# Patient Record
Sex: FEMALE | Race: OTHER | HISPANIC OR LATINO
[De-identification: names, ages, dates, MRNs, and addresses within clinical notes are randomized per-mention and may not be internally consistent; named-entity substitution may affect disease eponyms.]

---

## 2017-01-10 ENCOUNTER — APPOINTMENT (OUTPATIENT)
Dept: INTERNAL MEDICINE | Facility: CLINIC | Age: 42
End: 2017-01-10

## 2017-01-10 ENCOUNTER — MED ADMIN CHARGE (OUTPATIENT)
Age: 42
End: 2017-01-10

## 2017-01-10 ENCOUNTER — OUTPATIENT (OUTPATIENT)
Dept: OUTPATIENT SERVICES | Facility: HOSPITAL | Age: 42
LOS: 1 days | End: 2017-01-10
Payer: MEDICAID

## 2017-01-10 VITALS — BODY MASS INDEX: 21.63 KG/M2 | DIASTOLIC BLOOD PRESSURE: 69 MMHG | WEIGHT: 134 LBS | SYSTOLIC BLOOD PRESSURE: 109 MMHG

## 2017-01-10 DIAGNOSIS — I10 ESSENTIAL (PRIMARY) HYPERTENSION: ICD-10-CM

## 2017-01-10 PROCEDURE — 90670 PCV13 VACCINE IM: CPT

## 2017-01-10 PROCEDURE — 90688 IIV4 VACCINE SPLT 0.5 ML IM: CPT

## 2017-01-10 PROCEDURE — G0463: CPT

## 2017-01-10 PROCEDURE — G0009: CPT

## 2017-01-10 PROCEDURE — G0008: CPT

## 2017-01-11 DIAGNOSIS — Z23 ENCOUNTER FOR IMMUNIZATION: ICD-10-CM

## 2017-01-11 DIAGNOSIS — Z00.00 ENCOUNTER FOR GENERAL ADULT MEDICAL EXAMINATION WITHOUT ABNORMAL FINDINGS: ICD-10-CM

## 2017-01-12 LAB
ANION GAP SERPL CALC-SCNC: 14 MMOL/L
BASOPHILS # BLD AUTO: 0.01 K/UL
BASOPHILS NFR BLD AUTO: 0.2 %
BUN SERPL-MCNC: 13 MG/DL
CALCIUM SERPL-MCNC: 9.4 MG/DL
CHLORIDE SERPL-SCNC: 103 MMOL/L
CO2 SERPL-SCNC: 25 MMOL/L
CREAT SERPL-MCNC: 0.56 MG/DL
EOSINOPHIL # BLD AUTO: 0.05 K/UL
EOSINOPHIL NFR BLD AUTO: 0.9 %
GLUCOSE SERPL-MCNC: 87 MG/DL
HCT VFR BLD CALC: 39.2 %
HGB BLD-MCNC: 13.1 G/DL
IMM GRANULOCYTES NFR BLD AUTO: 0.2 %
LYMPHOCYTES # BLD AUTO: 2.43 K/UL
LYMPHOCYTES NFR BLD AUTO: 46 %
MAN DIFF?: NORMAL
MCHC RBC-ENTMCNC: 31.6 PG
MCHC RBC-ENTMCNC: 33.4 GM/DL
MCV RBC AUTO: 94.7 FL
MONOCYTES # BLD AUTO: 0.42 K/UL
MONOCYTES NFR BLD AUTO: 8 %
NEUTROPHILS # BLD AUTO: 2.36 K/UL
NEUTROPHILS NFR BLD AUTO: 44.7 %
PLATELET # BLD AUTO: 303 K/UL
POTASSIUM SERPL-SCNC: 4 MMOL/L
RBC # BLD: 4.14 M/UL
RBC # FLD: 12.8 %
SODIUM SERPL-SCNC: 142 MMOL/L
TSH SERPL-ACNC: 1.16 UIU/ML
WBC # FLD AUTO: 5.28 K/UL

## 2017-01-23 ENCOUNTER — FORM ENCOUNTER (OUTPATIENT)
Age: 42
End: 2017-01-23

## 2017-01-24 ENCOUNTER — OUTPATIENT (OUTPATIENT)
Dept: OUTPATIENT SERVICES | Facility: HOSPITAL | Age: 42
LOS: 1 days | End: 2017-01-24
Payer: MEDICAID

## 2017-01-24 ENCOUNTER — APPOINTMENT (OUTPATIENT)
Dept: RADIOLOGY | Facility: IMAGING CENTER | Age: 42
End: 2017-01-24

## 2017-01-24 DIAGNOSIS — Z00.00 ENCOUNTER FOR GENERAL ADULT MEDICAL EXAMINATION WITHOUT ABNORMAL FINDINGS: ICD-10-CM

## 2017-01-24 DIAGNOSIS — Z00.8 ENCOUNTER FOR OTHER GENERAL EXAMINATION: ICD-10-CM

## 2017-01-24 DIAGNOSIS — Z23 ENCOUNTER FOR IMMUNIZATION: ICD-10-CM

## 2017-01-24 PROCEDURE — 77080 DXA BONE DENSITY AXIAL: CPT

## 2017-02-14 ENCOUNTER — APPOINTMENT (OUTPATIENT)
Dept: INTERNAL MEDICINE | Facility: CLINIC | Age: 42
End: 2017-02-14

## 2017-03-21 ENCOUNTER — APPOINTMENT (OUTPATIENT)
Dept: ULTRASOUND IMAGING | Facility: IMAGING CENTER | Age: 42
End: 2017-03-21

## 2017-03-21 ENCOUNTER — APPOINTMENT (OUTPATIENT)
Dept: MAMMOGRAPHY | Facility: IMAGING CENTER | Age: 42
End: 2017-03-21

## 2017-04-10 ENCOUNTER — FORM ENCOUNTER (OUTPATIENT)
Age: 42
End: 2017-04-10

## 2017-04-11 ENCOUNTER — APPOINTMENT (OUTPATIENT)
Dept: ULTRASOUND IMAGING | Facility: IMAGING CENTER | Age: 42
End: 2017-04-11

## 2017-04-11 ENCOUNTER — APPOINTMENT (OUTPATIENT)
Dept: MAMMOGRAPHY | Facility: IMAGING CENTER | Age: 42
End: 2017-04-11

## 2017-04-11 ENCOUNTER — OUTPATIENT (OUTPATIENT)
Dept: OUTPATIENT SERVICES | Facility: HOSPITAL | Age: 42
LOS: 1 days | End: 2017-04-11
Payer: MEDICAID

## 2017-04-11 DIAGNOSIS — Z00.8 ENCOUNTER FOR OTHER GENERAL EXAMINATION: ICD-10-CM

## 2017-04-11 PROCEDURE — 76641 ULTRASOUND BREAST COMPLETE: CPT

## 2017-04-11 PROCEDURE — 77067 SCR MAMMO BI INCL CAD: CPT

## 2017-04-11 PROCEDURE — 77063 BREAST TOMOSYNTHESIS BI: CPT

## 2017-07-31 ENCOUNTER — LABORATORY RESULT (OUTPATIENT)
Age: 42
End: 2017-07-31

## 2017-07-31 ENCOUNTER — OUTPATIENT (OUTPATIENT)
Dept: OUTPATIENT SERVICES | Facility: HOSPITAL | Age: 42
LOS: 1 days | End: 2017-07-31
Payer: MEDICAID

## 2017-07-31 ENCOUNTER — APPOINTMENT (OUTPATIENT)
Dept: INTERNAL MEDICINE | Facility: CLINIC | Age: 42
End: 2017-07-31
Payer: MEDICAID

## 2017-07-31 VITALS
HEIGHT: 66 IN | BODY MASS INDEX: 20.89 KG/M2 | WEIGHT: 130 LBS | DIASTOLIC BLOOD PRESSURE: 70 MMHG | TEMPERATURE: 99.3 F | SYSTOLIC BLOOD PRESSURE: 102 MMHG

## 2017-07-31 DIAGNOSIS — I10 ESSENTIAL (PRIMARY) HYPERTENSION: ICD-10-CM

## 2017-07-31 LAB
APTT BLD: 37.6 SEC — HIGH (ref 27.5–37.4)
HBA1C BLD-MCNC: 5.4 % — SIGNIFICANT CHANGE UP (ref 4–5.6)
HCT VFR BLD CALC: 43.6 % — SIGNIFICANT CHANGE UP (ref 34.5–45)
HGB BLD-MCNC: 14.2 G/DL — SIGNIFICANT CHANGE UP (ref 11.5–15.5)
INR BLD: 0.96 RATIO — SIGNIFICANT CHANGE UP (ref 0.88–1.16)
MCHC RBC-ENTMCNC: 31.5 PG — SIGNIFICANT CHANGE UP (ref 27–34)
MCHC RBC-ENTMCNC: 32.6 GM/DL — SIGNIFICANT CHANGE UP (ref 32–36)
MCV RBC AUTO: 96.7 FL — SIGNIFICANT CHANGE UP (ref 80–100)
PLATELET # BLD AUTO: 314 K/UL — SIGNIFICANT CHANGE UP (ref 150–400)
PROTHROM AB SERPL-ACNC: 10.8 SEC — SIGNIFICANT CHANGE UP (ref 10–13.1)
RBC # BLD: 4.51 M/UL — SIGNIFICANT CHANGE UP (ref 3.8–5.2)
RBC # FLD: 13 % — SIGNIFICANT CHANGE UP (ref 10.3–14.5)
WBC # BLD: 4.51 K/UL — SIGNIFICANT CHANGE UP (ref 3.8–10.5)
WBC # FLD AUTO: 4.51 K/UL — SIGNIFICANT CHANGE UP (ref 3.8–10.5)

## 2017-07-31 PROCEDURE — 83036 HEMOGLOBIN GLYCOSYLATED A1C: CPT

## 2017-07-31 PROCEDURE — 80048 BASIC METABOLIC PNL TOTAL CA: CPT

## 2017-07-31 PROCEDURE — 85610 PROTHROMBIN TIME: CPT

## 2017-07-31 PROCEDURE — G0463: CPT

## 2017-07-31 PROCEDURE — 84443 ASSAY THYROID STIM HORMONE: CPT

## 2017-07-31 PROCEDURE — 99213 OFFICE O/P EST LOW 20 MIN: CPT | Mod: GE

## 2017-07-31 PROCEDURE — 85027 COMPLETE CBC AUTOMATED: CPT

## 2017-07-31 PROCEDURE — 85730 THROMBOPLASTIN TIME PARTIAL: CPT

## 2017-08-01 LAB
ANION GAP SERPL CALC-SCNC: 15 MMOL/L — SIGNIFICANT CHANGE UP (ref 5–17)
BUN SERPL-MCNC: 11 MG/DL — SIGNIFICANT CHANGE UP (ref 7–23)
CALCIUM SERPL-MCNC: 9.9 MG/DL — SIGNIFICANT CHANGE UP (ref 8.4–10.5)
CHLORIDE SERPL-SCNC: 101 MMOL/L — SIGNIFICANT CHANGE UP (ref 96–108)
CO2 SERPL-SCNC: 24 MMOL/L — SIGNIFICANT CHANGE UP (ref 22–31)
CREAT SERPL-MCNC: 0.75 MG/DL — SIGNIFICANT CHANGE UP (ref 0.5–1.3)
GLUCOSE SERPL-MCNC: 115 MG/DL — HIGH (ref 70–99)
POTASSIUM SERPL-MCNC: 4.2 MMOL/L — SIGNIFICANT CHANGE UP (ref 3.5–5.3)
POTASSIUM SERPL-SCNC: 4.2 MMOL/L — SIGNIFICANT CHANGE UP (ref 3.5–5.3)
SODIUM SERPL-SCNC: 140 MMOL/L — SIGNIFICANT CHANGE UP (ref 135–145)
TSH SERPL-MCNC: 1.87 UIU/ML — SIGNIFICANT CHANGE UP (ref 0.27–4.2)

## 2017-08-09 DIAGNOSIS — F41.1 GENERALIZED ANXIETY DISORDER: ICD-10-CM

## 2018-01-12 ENCOUNTER — APPOINTMENT (OUTPATIENT)
Dept: INTERNAL MEDICINE | Facility: CLINIC | Age: 43
End: 2018-01-12
Payer: MEDICAID

## 2018-01-12 VITALS
BODY MASS INDEX: 21.69 KG/M2 | HEIGHT: 66 IN | SYSTOLIC BLOOD PRESSURE: 100 MMHG | DIASTOLIC BLOOD PRESSURE: 80 MMHG | WEIGHT: 135 LBS

## 2018-01-12 PROCEDURE — 99214 OFFICE O/P EST MOD 30 MIN: CPT | Mod: GC

## 2018-02-01 ENCOUNTER — OUTPATIENT (OUTPATIENT)
Dept: OUTPATIENT SERVICES | Facility: HOSPITAL | Age: 43
LOS: 1 days | End: 2018-02-01
Payer: MEDICAID

## 2018-02-01 ENCOUNTER — APPOINTMENT (OUTPATIENT)
Dept: OBGYN | Facility: CLINIC | Age: 43
End: 2018-02-01
Payer: MEDICAID

## 2018-02-01 VITALS
DIASTOLIC BLOOD PRESSURE: 62 MMHG | SYSTOLIC BLOOD PRESSURE: 100 MMHG | BODY MASS INDEX: 21.74 KG/M2 | HEIGHT: 66 IN | WEIGHT: 135.25 LBS

## 2018-02-01 DIAGNOSIS — N76.0 ACUTE VAGINITIS: ICD-10-CM

## 2018-02-01 DIAGNOSIS — N95.2 POSTMENOPAUSAL ATROPHIC VAGINITIS: ICD-10-CM

## 2018-02-01 PROCEDURE — G0463: CPT

## 2018-02-01 PROCEDURE — 99213 OFFICE O/P EST LOW 20 MIN: CPT | Mod: GC

## 2018-02-09 ENCOUNTER — APPOINTMENT (OUTPATIENT)
Dept: SURGICAL ONCOLOGY | Facility: CLINIC | Age: 43
End: 2018-02-09
Payer: MEDICAID

## 2018-02-09 VITALS
HEIGHT: 62 IN | BODY MASS INDEX: 24.66 KG/M2 | WEIGHT: 134 LBS | OXYGEN SATURATION: 100 % | DIASTOLIC BLOOD PRESSURE: 68 MMHG | HEART RATE: 75 BPM | SYSTOLIC BLOOD PRESSURE: 109 MMHG

## 2018-02-09 PROCEDURE — 99214 OFFICE O/P EST MOD 30 MIN: CPT

## 2018-02-16 ENCOUNTER — OUTPATIENT (OUTPATIENT)
Dept: OUTPATIENT SERVICES | Facility: HOSPITAL | Age: 43
LOS: 1 days | End: 2018-02-16
Payer: MEDICAID

## 2018-02-16 ENCOUNTER — APPOINTMENT (OUTPATIENT)
Dept: INTERNAL MEDICINE | Facility: CLINIC | Age: 43
End: 2018-02-16
Payer: MEDICAID

## 2018-02-16 VITALS
DIASTOLIC BLOOD PRESSURE: 68 MMHG | OXYGEN SATURATION: 98 % | BODY MASS INDEX: 24.48 KG/M2 | HEIGHT: 62 IN | WEIGHT: 133 LBS | HEART RATE: 73 BPM | SYSTOLIC BLOOD PRESSURE: 110 MMHG

## 2018-02-16 DIAGNOSIS — K64.9 UNSPECIFIED HEMORRHOIDS: ICD-10-CM

## 2018-02-16 DIAGNOSIS — I10 ESSENTIAL (PRIMARY) HYPERTENSION: ICD-10-CM

## 2018-02-16 PROCEDURE — 99213 OFFICE O/P EST LOW 20 MIN: CPT | Mod: GE

## 2018-02-16 PROCEDURE — G0463: CPT

## 2018-04-27 ENCOUNTER — APPOINTMENT (OUTPATIENT)
Dept: INTERNAL MEDICINE | Facility: CLINIC | Age: 43
End: 2018-04-27

## 2018-06-29 ENCOUNTER — FORM ENCOUNTER (OUTPATIENT)
Age: 43
End: 2018-06-29

## 2018-06-30 ENCOUNTER — OUTPATIENT (OUTPATIENT)
Dept: OUTPATIENT SERVICES | Facility: HOSPITAL | Age: 43
LOS: 1 days | End: 2018-06-30
Payer: MEDICAID

## 2018-06-30 ENCOUNTER — APPOINTMENT (OUTPATIENT)
Dept: ULTRASOUND IMAGING | Facility: IMAGING CENTER | Age: 43
End: 2018-06-30
Payer: MEDICAID

## 2018-06-30 ENCOUNTER — APPOINTMENT (OUTPATIENT)
Dept: MAMMOGRAPHY | Facility: IMAGING CENTER | Age: 43
End: 2018-06-30
Payer: MEDICAID

## 2018-06-30 DIAGNOSIS — Z00.8 ENCOUNTER FOR OTHER GENERAL EXAMINATION: ICD-10-CM

## 2018-06-30 PROCEDURE — 77066 DX MAMMO INCL CAD BI: CPT

## 2018-06-30 PROCEDURE — G0279: CPT

## 2018-06-30 PROCEDURE — 76641 ULTRASOUND BREAST COMPLETE: CPT | Mod: 26,50

## 2018-06-30 PROCEDURE — 77066 DX MAMMO INCL CAD BI: CPT | Mod: 26

## 2018-06-30 PROCEDURE — 76641 ULTRASOUND BREAST COMPLETE: CPT

## 2018-06-30 PROCEDURE — G0279: CPT | Mod: 26

## 2018-08-10 ENCOUNTER — TRANSCRIPTION ENCOUNTER (OUTPATIENT)
Age: 43
End: 2018-08-10

## 2018-10-01 ENCOUNTER — OUTPATIENT (OUTPATIENT)
Dept: OUTPATIENT SERVICES | Facility: HOSPITAL | Age: 43
LOS: 1 days | End: 2018-10-01

## 2018-10-18 DIAGNOSIS — Z71.89 OTHER SPECIFIED COUNSELING: ICD-10-CM

## 2019-01-28 ENCOUNTER — LABORATORY RESULT (OUTPATIENT)
Age: 44
End: 2019-01-28

## 2019-01-28 ENCOUNTER — OUTPATIENT (OUTPATIENT)
Dept: OUTPATIENT SERVICES | Facility: HOSPITAL | Age: 44
LOS: 1 days | End: 2019-01-28
Payer: MEDICAID

## 2019-01-28 ENCOUNTER — APPOINTMENT (OUTPATIENT)
Dept: INTERNAL MEDICINE | Facility: CLINIC | Age: 44
End: 2019-01-28
Payer: MEDICAID

## 2019-01-28 VITALS
HEIGHT: 62 IN | DIASTOLIC BLOOD PRESSURE: 80 MMHG | WEIGHT: 141 LBS | BODY MASS INDEX: 25.95 KG/M2 | SYSTOLIC BLOOD PRESSURE: 128 MMHG

## 2019-01-28 DIAGNOSIS — I10 ESSENTIAL (PRIMARY) HYPERTENSION: ICD-10-CM

## 2019-01-28 DIAGNOSIS — J32.9 CHRONIC SINUSITIS, UNSPECIFIED: ICD-10-CM

## 2019-01-28 DIAGNOSIS — K21.9 GASTRO-ESOPHAGEAL REFLUX DISEASE W/OUT ESOPHAGITIS: ICD-10-CM

## 2019-01-28 DIAGNOSIS — K21.9 GASTRO-ESOPHAGEAL REFLUX DISEASE WITHOUT ESOPHAGITIS: ICD-10-CM

## 2019-01-28 DIAGNOSIS — B00.9 HERPESVIRAL INFECTION, UNSPECIFIED: ICD-10-CM

## 2019-01-28 LAB
ALBUMIN SERPL ELPH-MCNC: 4.2 G/DL — SIGNIFICANT CHANGE UP (ref 3.3–5)
ALP SERPL-CCNC: 109 U/L — SIGNIFICANT CHANGE UP (ref 40–120)
ALT FLD-CCNC: 16 U/L — SIGNIFICANT CHANGE UP (ref 10–45)
ANION GAP SERPL CALC-SCNC: 12 MMOL/L — SIGNIFICANT CHANGE UP (ref 5–17)
AST SERPL-CCNC: 20 U/L — SIGNIFICANT CHANGE UP (ref 10–40)
BILIRUB SERPL-MCNC: 0.2 MG/DL — SIGNIFICANT CHANGE UP (ref 0.2–1.2)
BUN SERPL-MCNC: 13 MG/DL — SIGNIFICANT CHANGE UP (ref 7–23)
CALCIUM SERPL-MCNC: 9.9 MG/DL — SIGNIFICANT CHANGE UP (ref 8.4–10.5)
CHLORIDE SERPL-SCNC: 102 MMOL/L — SIGNIFICANT CHANGE UP (ref 96–108)
CHOLEST SERPL-MCNC: 266 MG/DL — HIGH (ref 10–199)
CO2 SERPL-SCNC: 28 MMOL/L — SIGNIFICANT CHANGE UP (ref 22–31)
CREAT SERPL-MCNC: 0.54 MG/DL — SIGNIFICANT CHANGE UP (ref 0.5–1.3)
GLUCOSE SERPL-MCNC: 90 MG/DL — SIGNIFICANT CHANGE UP (ref 70–99)
HBA1C BLD-MCNC: 5.4 % — SIGNIFICANT CHANGE UP (ref 4–5.6)
HCT VFR BLD CALC: 43 % — SIGNIFICANT CHANGE UP (ref 34.5–45)
HDLC SERPL-MCNC: 44 MG/DL — LOW
HGB BLD-MCNC: 14 G/DL — SIGNIFICANT CHANGE UP (ref 11.5–15.5)
LIPID PNL WITH DIRECT LDL SERPL: 176 MG/DL — HIGH
MCHC RBC-ENTMCNC: 31.6 PG — SIGNIFICANT CHANGE UP (ref 27–34)
MCHC RBC-ENTMCNC: 32.6 GM/DL — SIGNIFICANT CHANGE UP (ref 32–36)
MCV RBC AUTO: 97.1 FL — SIGNIFICANT CHANGE UP (ref 80–100)
PLATELET # BLD AUTO: 338 K/UL — SIGNIFICANT CHANGE UP (ref 150–400)
POTASSIUM SERPL-MCNC: 4.5 MMOL/L — SIGNIFICANT CHANGE UP (ref 3.5–5.3)
POTASSIUM SERPL-SCNC: 4.5 MMOL/L — SIGNIFICANT CHANGE UP (ref 3.5–5.3)
PROT SERPL-MCNC: 7.6 G/DL — SIGNIFICANT CHANGE UP (ref 6–8.3)
RBC # BLD: 4.43 M/UL — SIGNIFICANT CHANGE UP (ref 3.8–5.2)
RBC # FLD: 13.3 % — SIGNIFICANT CHANGE UP (ref 10.3–14.5)
SODIUM SERPL-SCNC: 142 MMOL/L — SIGNIFICANT CHANGE UP (ref 135–145)
TOTAL CHOLESTEROL/HDL RATIO MEASUREMENT: 6 RATIO — SIGNIFICANT CHANGE UP (ref 3.3–7.1)
TRIGL SERPL-MCNC: 232 MG/DL — HIGH (ref 10–149)
WBC # BLD: 5.38 K/UL — SIGNIFICANT CHANGE UP (ref 3.8–10.5)
WBC # FLD AUTO: 5.38 K/UL — SIGNIFICANT CHANGE UP (ref 3.8–10.5)

## 2019-01-28 PROCEDURE — 83036 HEMOGLOBIN GLYCOSYLATED A1C: CPT

## 2019-01-28 PROCEDURE — 80061 LIPID PANEL: CPT

## 2019-01-28 PROCEDURE — 80053 COMPREHEN METABOLIC PANEL: CPT

## 2019-01-28 PROCEDURE — 85027 COMPLETE CBC AUTOMATED: CPT

## 2019-01-28 PROCEDURE — G0463: CPT

## 2019-01-28 PROCEDURE — 86304 IMMUNOASSAY TUMOR CA 125: CPT

## 2019-01-28 PROCEDURE — 99213 OFFICE O/P EST LOW 20 MIN: CPT | Mod: GE

## 2019-01-29 LAB — CANCER AG125 SERPL-ACNC: 10 U/ML — SIGNIFICANT CHANGE UP

## 2019-02-01 NOTE — PHYSICAL EXAM
[No Acute Distress] : no acute distress [Normal Sclera/Conjunctiva] : normal sclera/conjunctiva [EOMI] : extraocular movements intact [Supple] : supple [No Lymphadenopathy] : no lymphadenopathy [No Respiratory Distress] : no respiratory distress  [Clear to Auscultation] : lungs were clear to auscultation bilaterally [Regular Rhythm] : with a regular rhythm [Normal S1, S2] : normal S1 and S2 [Non Tender] : non-tender [No HSM] : no HSM [Normal Bowel Sounds] : normal bowel sounds [Coordination Grossly Intact] : coordination grossly intact [de-identified] : Mildly erythematous pharynx

## 2019-02-01 NOTE — HEALTH RISK ASSESSMENT
[Good] : ~his/her~  mood as  good [0] : 1) Little interest or pleasure doing things: Not at all (0) [1] : 2) Feeling down, depressed, or hopeless for several days (1) [Patient reported mammogram was normal] : Patient reported mammogram was normal [Patient reported PAP Smear was normal] : Patient reported PAP Smear was normal [Patient reported bone density results were abnormal] : Patient reported bone density results were abnormal [With Family] : lives with family [Employed] : employed [] : No [[id number]] : 1 [Sexually Active] : not sexually active [MammogramDate] : 06/2018 [MammogramComments] : BiRADS2 [PapSmearDate] : 01/2018 [BoneDensityDate] : 06/2017 [BoneDensityComments] : osteopenia [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

## 2019-02-01 NOTE — REVIEW OF SYSTEMS
[Discharge] : discharge [Nosebleed] : nosebleed [Postnasal Drip] : postnasal drip [Fever] : no fever [Chills] : no chills [Nasal Discharge] : no nasal discharge [Sore Throat] : no sore throat [Chest Pain] : no chest pain [Palpitations] : no palpitations [Shortness Of Breath] : no shortness of breath [Cough] : no cough [Abdominal Pain] : no abdominal pain [Nausea] : no nausea [Vomiting] : no vomiting [Frequency] : no frequency [Headache] : no headache [Dizziness] : no dizziness [FreeTextEntry3] : Yellow crusty discharge

## 2019-02-01 NOTE — ASSESSMENT
[FreeTextEntry1] : The patient is a 43 yo F with PMH of ovarian cancer, dx 2004, s/p CALVIN, BSO, fibrocystic breast disease, GERD, anxiety, and hemorrhoids presenting here for annual physical exam. \par \par a) Sinusitis\par - persistent sinus pain/tenderness for 2 weeks\par - Prescribed Augmentin, x 10 days\par - renewed fluticasone and saline nasal spray\par \par b) GERD\par - stable\par - renewed pantoprazole\par \par c) Ovarian cancer- s/p CALVIN BSO\par - follows with gyn\par - referral for Pap smear \par \par d) HSV-2\par - no active infection\par - prescribed valacyclovir for prophylactic measure\par \par d) HCM\par - flu shot administered LT arm\par - referral for mammogram\par - f/u cbc, hga1c, cmp, lipid panel, CA-125

## 2019-02-01 NOTE — HISTORY OF PRESENT ILLNESS
[FreeTextEntry1] : Annual physical exam [de-identified] : The patient is a 41 yo F with PMH of ovarian cancer, dx 2004, s/p CALVIN, BSO, fibrocystic breast disease, GERD, anxiety, hemorrhoids presenting here for annual physical exam. Four months ago, she had bronchitis and went to urgent care and was treated with amoxicillin. Two weeks ago, she reported sinus pain/pressure, nose bleed secondary to dryness, postnasal drip, watery eyes  and headache. Denies sore throat, cough, fever, chills, dizziness, nausea, and vomiting.  She took three days of amoxicillin and sinus PE + allergy. Also, she has been taking nasal saline spray which provides temporary relief.

## 2019-03-11 ENCOUNTER — OUTPATIENT (OUTPATIENT)
Dept: OUTPATIENT SERVICES | Facility: HOSPITAL | Age: 44
LOS: 1 days | End: 2019-03-11
Payer: MEDICAID

## 2019-03-11 ENCOUNTER — TRANSCRIPTION ENCOUNTER (OUTPATIENT)
Age: 44
End: 2019-03-11

## 2019-03-11 ENCOUNTER — APPOINTMENT (OUTPATIENT)
Dept: INTERNAL MEDICINE | Facility: CLINIC | Age: 44
End: 2019-03-11
Payer: MEDICAID

## 2019-03-11 VITALS
WEIGHT: 141 LBS | BODY MASS INDEX: 25.95 KG/M2 | DIASTOLIC BLOOD PRESSURE: 82 MMHG | OXYGEN SATURATION: 96 % | HEIGHT: 62 IN | HEART RATE: 101 BPM | TEMPERATURE: 99 F | SYSTOLIC BLOOD PRESSURE: 120 MMHG

## 2019-03-11 DIAGNOSIS — I10 ESSENTIAL (PRIMARY) HYPERTENSION: ICD-10-CM

## 2019-03-11 PROCEDURE — G0463: CPT

## 2019-03-11 PROCEDURE — 99213 OFFICE O/P EST LOW 20 MIN: CPT | Mod: GE

## 2019-03-11 RX ORDER — SERTRALINE HYDROCHLORIDE 50 MG/1
50 TABLET, FILM COATED ORAL DAILY
Qty: 30 | Refills: 0 | Status: DISCONTINUED | COMMUNITY
Start: 2018-01-12 | End: 2019-03-11

## 2019-03-12 DIAGNOSIS — J18.9 PNEUMONIA, UNSPECIFIED ORGANISM: ICD-10-CM

## 2019-03-12 DIAGNOSIS — J11.1 INFLUENZA DUE TO UNIDENTIFIED INFLUENZA VIRUS WITH OTHER RESPIRATORY MANIFESTATIONS: ICD-10-CM

## 2019-03-12 NOTE — REVIEW OF SYSTEMS
[Chills] : chills [Fatigue] : fatigue [Nasal Discharge] : nasal discharge [Sore Throat] : sore throat [Muscle Pain] : muscle pain [Fever] : no fever [Night Sweats] : no night sweats [Hoarseness] : no hoarseness [Chest Pain] : no chest pain [Palpitations] : no palpitations [Lower Ext Edema] : no lower extremity edema [Abdominal Pain] : no abdominal pain [Nausea] : no nausea [Vomiting] : no vomiting [Dysuria] : no dysuria [Hematuria] : no hematuria [FreeTextEntry3] : Teary eyes

## 2019-03-12 NOTE — ASSESSMENT
[FreeTextEntry1] : Patient is a 42 year old female with hx of ovarian cancer diagnosed in 2004 s/p CALVIN/BSO, fibrocystic breast disease, anxiety and hemorrhoids who presents today with complaints of flu like symptoms. \par \par # Flu with underlying bacterial PNA \par - patient has diminished breath sounds on the right side RML/RLL likely consistent with a secondary bacterial PNA\par - currently on D3/7 of levaquin and albuterol, continue as as scheduled \par - currently outside the 48hr window for tamiflu\par - discussed using albuterol ATC, will send xopenex in hopes of decreased palpitations which could be anxiety related however explained that there might be a prior auth that might need to be obtain. Will add on atrovent and explained that she can stagger in between albuterol doses \par - if symptoms do not improve or if PO intake decreases, will need to go to the hospital - - patient is aware \par \par Case discussed with Dr. Hanson

## 2019-03-12 NOTE — PHYSICAL EXAM
[Ill-Appearing] : ill-appearing [Normal Sclera/Conjunctiva] : normal sclera/conjunctiva [PERRL] : pupils equal round and reactive to light [EOMI] : extraocular movements intact [Normal Outer Ear/Nose] : the outer ears and nose were normal in appearance [Normal TMs] : both tympanic membranes were normal [Normal Rate] : normal rate  [Regular Rhythm] : with a regular rhythm [Normal S1, S2] : normal S1 and S2 [No Murmur] : no murmur heard [No Edema] : there was no peripheral edema [de-identified] : Clinically stable  [de-identified] : Erythematous oropharynx with some tonsillar swelling  [de-identified] : Wheezes in upper lung fields and decreased breath sounds in the RML/RLL.

## 2019-03-12 NOTE — HISTORY OF PRESENT ILLNESS
[FreeTextEntry8] : Patient is a 42 year old female with hx of ovarian cancer diagnosed in 2004 s/p CALVIN/BSO, fibrocystic breast disease, anxiety and hemorrhoids who presents today with complaints of flu like symptoms. Patient was recently seen at the end of January for CPE and was treated for sinusitis at the time. \par \par Patient presents today with flu like symptoms. States that she started having fevers, myalgias, sore throat this past Thursday. Was taking dayquil nyquil but went to urgent care in [facility] and was swabbed and told that she was flu positive. Patient was however not given tamiflu - - unclear as to why. CXR was performed with a note of a compounding PNA - - and she was started on levaquin (D3/7) along with albuterol States that she has not gotten worse but states that she is still not getting better. \par \par Still endorses productive cough with yellow sputum, myalgias along with intermittent choking episodes from the congestion. States that this is giving her anxiety and subsequent insomnia.

## 2019-03-15 RX ORDER — LEVALBUTEROL HYDROCHLORIDE 0.31 MG/3ML
0.31 SOLUTION RESPIRATORY (INHALATION)
Qty: 1 | Refills: 0 | Status: DISCONTINUED | COMMUNITY
Start: 2019-03-11 | End: 2019-03-15

## 2019-03-29 ENCOUNTER — APPOINTMENT (OUTPATIENT)
Dept: INTERNAL MEDICINE | Facility: CLINIC | Age: 44
End: 2019-03-29
Payer: MEDICAID

## 2019-03-29 ENCOUNTER — OUTPATIENT (OUTPATIENT)
Dept: OUTPATIENT SERVICES | Facility: HOSPITAL | Age: 44
LOS: 1 days | End: 2019-03-29
Payer: MEDICAID

## 2019-03-29 VITALS
SYSTOLIC BLOOD PRESSURE: 100 MMHG | HEIGHT: 62 IN | DIASTOLIC BLOOD PRESSURE: 60 MMHG | HEART RATE: 79 BPM | OXYGEN SATURATION: 98 % | BODY MASS INDEX: 26.13 KG/M2 | WEIGHT: 142 LBS

## 2019-03-29 DIAGNOSIS — K64.8 OTHER HEMORRHOIDS: ICD-10-CM

## 2019-03-29 DIAGNOSIS — Z80.41 FAMILY HISTORY OF MALIGNANT NEOPLASM OF OVARY: ICD-10-CM

## 2019-03-29 DIAGNOSIS — Z87.09 PERSONAL HISTORY OF OTHER DISEASES OF THE RESPIRATORY SYSTEM: ICD-10-CM

## 2019-03-29 DIAGNOSIS — L03.119 CELLULITIS OF UNSPECIFIED PART OF LIMB: ICD-10-CM

## 2019-03-29 DIAGNOSIS — Z87.440 PERSONAL HISTORY OF URINARY (TRACT) INFECTIONS: ICD-10-CM

## 2019-03-29 DIAGNOSIS — Z87.19 PERSONAL HISTORY OF OTHER DISEASES OF THE DIGESTIVE SYSTEM: ICD-10-CM

## 2019-03-29 DIAGNOSIS — Z12.72 ENCOUNTER FOR SCREENING FOR MALIGNANT NEOPLASM OF VAGINA: ICD-10-CM

## 2019-03-29 DIAGNOSIS — Z92.29 PERSONAL HISTORY OF OTHER DRUG THERAPY: ICD-10-CM

## 2019-03-29 DIAGNOSIS — I10 ESSENTIAL (PRIMARY) HYPERTENSION: ICD-10-CM

## 2019-03-29 DIAGNOSIS — Z87.898 PERSONAL HISTORY OF OTHER SPECIFIED CONDITIONS: ICD-10-CM

## 2019-03-29 DIAGNOSIS — Z01.419 ENCOUNTER FOR GYNECOLOGICAL EXAMINATION (GENERAL) (ROUTINE) W/OUT ABNORMAL FINDINGS: ICD-10-CM

## 2019-03-29 DIAGNOSIS — R05 COUGH: ICD-10-CM

## 2019-03-29 DIAGNOSIS — L98.9 DISORDER OF THE SKIN AND SUBCUTANEOUS TISSUE, UNSPECIFIED: ICD-10-CM

## 2019-03-29 PROCEDURE — G0463: CPT

## 2019-03-29 PROCEDURE — 99213 OFFICE O/P EST LOW 20 MIN: CPT | Mod: GE

## 2019-03-29 RX ORDER — ADHESIVE TAPE 3"X 2.3 YD
50 MCG TAPE, NON-MEDICATED TOPICAL
Qty: 30 | Refills: 0 | Status: DISCONTINUED | COMMUNITY
Start: 2018-01-12 | End: 2019-03-29

## 2019-03-29 RX ORDER — OMEPRAZOLE 20 MG/1
20 CAPSULE, DELAYED RELEASE ORAL DAILY
Qty: 30 | Refills: 2 | Status: DISCONTINUED | COMMUNITY
Start: 2018-02-16 | End: 2019-03-29

## 2019-03-29 RX ORDER — CLOTRIMAZOLE AND BETAMETHASONE DIPROPIONATE 10; .5 MG/G; MG/G
1-0.05 CREAM TOPICAL TWICE DAILY
Qty: 1 | Refills: 0 | Status: DISCONTINUED | COMMUNITY
Start: 2018-01-12 | End: 2019-03-29

## 2019-03-29 RX ORDER — ALPRAZOLAM 0.25 MG/1
0.25 TABLET ORAL EVERY 6 HOURS
Qty: 16 | Refills: 0 | Status: DISCONTINUED | COMMUNITY
Start: 2017-07-31 | End: 2019-03-29

## 2019-03-29 RX ORDER — HYDROCORTISONE 25 MG/G
2.5 CREAM TOPICAL TWICE DAILY
Qty: 1 | Refills: 0 | Status: DISCONTINUED | COMMUNITY
Start: 2018-02-16 | End: 2019-03-29

## 2019-03-29 RX ORDER — AMOXICILLIN AND CLAVULANATE POTASSIUM 875; 125 MG/1; MG/1
875-125 TABLET, COATED ORAL
Qty: 20 | Refills: 0 | Status: DISCONTINUED | COMMUNITY
Start: 2019-01-28 | End: 2019-03-29

## 2019-04-01 NOTE — PHYSICAL EXAM
[No Acute Distress] : no acute distress [Well-Appearing] : well-appearing [Normal Sclera/Conjunctiva] : normal sclera/conjunctiva [Normal Outer Ear/Nose] : the outer ears and nose were normal in appearance [Normal Oropharynx] : the oropharynx was normal [Normal TMs] : both tympanic membranes were normal [Supple] : supple [No Lymphadenopathy] : no lymphadenopathy [No Respiratory Distress] : no respiratory distress  [Clear to Auscultation] : lungs were clear to auscultation bilaterally [Normal Rate] : normal rate  [Regular Rhythm] : with a regular rhythm [de-identified] : mild erythema of nasal mucosa

## 2019-04-01 NOTE — REVIEW OF SYSTEMS
[Postnasal Drip] : postnasal drip [Cough] : cough [Negative] : Neurological [Fever] : no fever [Chills] : no chills [Discharge] : no discharge [Redness] : no redness [Itching] : no itching [Earache] : no earache [Hoarseness] : no hoarseness [Nasal Discharge] : no nasal discharge [Chest Pain] : no chest pain [Palpitations] : no palpitations [Shortness Of Breath] : no shortness of breath [Wheezing] : no wheezing

## 2019-04-01 NOTE — HISTORY OF PRESENT ILLNESS
[FreeTextEntry1] : 43F with PMH ovarian CA (diagnosed 2004, s/p CALVIN/BSO), fibrocystic breast disease, osteopenia, anxiety and hemorrhoids presenting for follow-up.  [de-identified] : Patient last seen in clinic 3/11 after being diagnosed with influenza with superimposed bacterial PNA at an urgent care center several days prior. Patient was on antibiotics at the time but was not feeling much better. She was endorsing persistent productive cough minimally improved by the albuterol inhaler prescribed to her by urgent care. She was prescribed an Atrovent inhaler and levalbuterol nebulizer, however, patient states she never received the medications d/t insurance. Patient presents today with throat irritation and cough sometimes productive of greenish sputum. She is overall feeling better but would like relief from these sxs. Denies fever, chills, worsening nasal congestion or cough.

## 2019-04-03 DIAGNOSIS — R09.82 POSTNASAL DRIP: ICD-10-CM

## 2019-04-03 DIAGNOSIS — R05 COUGH: ICD-10-CM

## 2019-04-03 DIAGNOSIS — J18.9 PNEUMONIA, UNSPECIFIED ORGANISM: ICD-10-CM

## 2019-12-17 ENCOUNTER — APPOINTMENT (OUTPATIENT)
Dept: INTERNAL MEDICINE | Facility: CLINIC | Age: 44
End: 2019-12-17
Payer: MEDICAID

## 2019-12-17 ENCOUNTER — OUTPATIENT (OUTPATIENT)
Dept: OUTPATIENT SERVICES | Facility: HOSPITAL | Age: 44
LOS: 1 days | End: 2019-12-17
Payer: MEDICAID

## 2019-12-17 VITALS
BODY MASS INDEX: 24.29 KG/M2 | SYSTOLIC BLOOD PRESSURE: 120 MMHG | HEIGHT: 62 IN | DIASTOLIC BLOOD PRESSURE: 70 MMHG | WEIGHT: 132 LBS

## 2019-12-17 DIAGNOSIS — C56.9 MALIGNANT NEOPLASM OF UNSPECIFIED OVARY: ICD-10-CM

## 2019-12-17 DIAGNOSIS — I10 ESSENTIAL (PRIMARY) HYPERTENSION: ICD-10-CM

## 2019-12-17 PROCEDURE — 99214 OFFICE O/P EST MOD 30 MIN: CPT | Mod: GC

## 2019-12-17 PROCEDURE — G0463: CPT

## 2019-12-17 RX ORDER — IPRATROPIUM BROMIDE 17 UG/1
17 AEROSOL, METERED RESPIRATORY (INHALATION) 4 TIMES DAILY
Qty: 1 | Refills: 1 | Status: DISCONTINUED | COMMUNITY
Start: 2019-03-11 | End: 2019-12-17

## 2019-12-17 NOTE — PHYSICAL EXAM
[No Acute Distress] : no acute distress [Well Nourished] : well nourished [Well Developed] : well developed [Well-Appearing] : well-appearing [Normal Sclera/Conjunctiva] : normal sclera/conjunctiva [EOMI] : extraocular movements intact [PERRL] : pupils equal round and reactive to light [Normal Oropharynx] : the oropharynx was normal [No JVD] : no jugular venous distention [No Lymphadenopathy] : no lymphadenopathy [Normal Outer Ear/Nose] : the outer ears and nose were normal in appearance [Thyroid Normal, No Nodules] : the thyroid was normal and there were no nodules present [Supple] : supple [No Accessory Muscle Use] : no accessory muscle use [Clear to Auscultation] : lungs were clear to auscultation bilaterally [No Respiratory Distress] : no respiratory distress  [Normal Rate] : normal rate  [Normal S1, S2] : normal S1 and S2 [No Murmur] : no murmur heard [Regular Rhythm] : with a regular rhythm [No Abdominal Bruit] : a ~M bruit was not heard ~T in the abdomen [No Carotid Bruits] : no carotid bruits [No Varicosities] : no varicosities [No Palpable Aorta] : no palpable aorta [Pedal Pulses Present] : the pedal pulses are present [No Edema] : there was no peripheral edema [Soft] : abdomen soft [No Extremity Clubbing/Cyanosis] : no extremity clubbing/cyanosis [No Masses] : no abdominal mass palpated [Non Tender] : non-tender [Non-distended] : non-distended [Normal Bowel Sounds] : normal bowel sounds [No HSM] : no HSM [Normal Posterior Cervical Nodes] : no posterior cervical lymphadenopathy [Normal Anterior Cervical Nodes] : no anterior cervical lymphadenopathy [No CVA Tenderness] : no CVA  tenderness [No Spinal Tenderness] : no spinal tenderness [No Joint Swelling] : no joint swelling [Coordination Grossly Intact] : coordination grossly intact [Grossly Normal Strength/Tone] : grossly normal strength/tone [No Rash] : no rash [No Focal Deficits] : no focal deficits [Deep Tendon Reflexes (DTR)] : deep tendon reflexes were 2+ and symmetric [Normal Gait] : normal gait [Normal Affect] : the affect was normal [Normal Insight/Judgement] : insight and judgment were intact

## 2019-12-19 NOTE — ASSESSMENT
[FreeTextEntry1] : 43F with PMH ovarian CA (diagnosed 2004, s/p CALVIN/BSO), fibrocystic breast disease, osteopenia, anxiety and hemorrhoids presenting for TB forms.\par \par #latent TB- Reactivation of TB is likely rare in her case. She was probably exposed many years ago from her home country, and is asymptomatic. It may be good to treat her in the case that she ever becomes immunocompromised, or requires more chemo. Will document latent TB as "not indicated", but will "electively decide to treat." She request treatment after new years.\par \par #F/U in 5 weeks for CPE and TB therapy.

## 2019-12-19 NOTE — HISTORY OF PRESENT ILLNESS
[FreeTextEntry1] : Forms [de-identified] : 43F with PMH ovarian CA (diagnosed 2004, s/p CALVIN/BSO), fibrocystic breast disease, osteopenia, anxiety and hemorrhoids presenting for forms regarding treatment for latent TB. She notes that she recently started working in the [employer], and that she had a T Spot test for TB which was positive and a PPD that was indeterminate. She also had Xray of chest which was negatibve. SHe notes that history wise, she has been in the  for the last 25 years. She has lived in [place], is not homeless, does not use drugs and is asymptomatic from TB standpoint: denies fevers, chills, cough, night sweats, weight loss. She notes having BCG in past in [place], and traveling back every 2-3 years. She has felt well recenlty and has no complaints. She is wondering whether or not to be treated for potential latent TB.

## 2019-12-30 DIAGNOSIS — Z22.7 LATENT TUBERCULOSIS: ICD-10-CM

## 2019-12-30 DIAGNOSIS — C56.9 MALIGNANT NEOPLASM OF UNSPECIFIED OVARY: ICD-10-CM

## 2020-03-11 ENCOUNTER — APPOINTMENT (OUTPATIENT)
Dept: SURGICAL ONCOLOGY | Facility: CLINIC | Age: 45
End: 2020-03-11
Payer: MEDICAID

## 2020-03-11 VITALS
HEIGHT: 62 IN | RESPIRATION RATE: 15 BRPM | OXYGEN SATURATION: 99 % | WEIGHT: 144 LBS | BODY MASS INDEX: 26.5 KG/M2 | SYSTOLIC BLOOD PRESSURE: 130 MMHG | DIASTOLIC BLOOD PRESSURE: 74 MMHG | HEART RATE: 87 BPM

## 2020-03-11 PROCEDURE — 99214 OFFICE O/P EST MOD 30 MIN: CPT

## 2020-03-11 NOTE — HISTORY OF PRESENT ILLNESS
[de-identified] : 45 y/o female presents for breast follow up.\par \par Diagnostic Bilateral Breast US/MMG (6/30/18): No mammographic evidence of malignancy. No sonographic evidence of malignancy. Recommend yearly imaging. BIRADS-2\par MMG/US 4/11/17: No evidence of malignancy, probably benign hypoechoic mass right breast 10:00 2-3 cm FN which has been stable since 1/26/16. BIRADS-3 \par \par Her history is significant for benign bilateral breast surgeries in 2004. \par She also has a personal history of ovarian cancer. \par \par She notes no new recent imaging.\par She states she feels that her breasts have increased in size.\par Denies palpable breast masses, nipple discharge, nipple retraction/inversion, or skin changes. \par Denies breast pain. \par Denies fever or chills.

## 2020-03-11 NOTE — CONSULT LETTER
[Dear  ___] : Dear  [unfilled], [Courtesy Letter:] : I had the pleasure of seeing your patient, [unfilled], in my office today. [Please see my note below.] : Please see my note below. [Sincerely,] : Sincerely, [FreeTextEntry3] : Ramo Coppola MD FACS

## 2020-03-11 NOTE — ADDENDUM
[FreeTextEntry1] : I, Chyna Potts, acted solely as a scribe for Dr. Ramo Coppola on this date 03/11/2020.

## 2020-03-11 NOTE — ASSESSMENT
[FreeTextEntry1] : Fibrocystic breast changes\par Normal physical examination\par BIRADS-2 breast imaging June 2018\par Will order yearly breast imaging now\par RTO 1 year after next year's breast imaging

## 2020-03-11 NOTE — PHYSICAL EXAM
[Normal] : supple, no neck mass and thyroid not enlarged [Normal Neck Lymph Nodes] : normal neck lymph nodes  [Normal Supraclavicular Lymph Nodes] : normal supraclavicular lymph nodes [Normal Groin Lymph Nodes] : normal groin lymph nodes [Normal Axillary Lymph Nodes] : normal axillary lymph nodes [Normal] : oriented to person, place and time, with appropriate affect [de-identified] : No masses or adenopathy bilaterally.

## 2020-04-20 ENCOUNTER — OUTPATIENT (OUTPATIENT)
Dept: OUTPATIENT SERVICES | Facility: HOSPITAL | Age: 45
LOS: 1 days | End: 2020-04-20
Payer: MEDICAID

## 2020-04-20 ENCOUNTER — APPOINTMENT (OUTPATIENT)
Dept: MAMMOGRAPHY | Facility: IMAGING CENTER | Age: 45
End: 2020-04-20
Payer: MEDICAID

## 2020-04-20 ENCOUNTER — APPOINTMENT (OUTPATIENT)
Dept: ULTRASOUND IMAGING | Facility: IMAGING CENTER | Age: 45
End: 2020-04-20
Payer: MEDICAID

## 2020-04-20 ENCOUNTER — RESULT REVIEW (OUTPATIENT)
Age: 45
End: 2020-04-20

## 2020-04-20 DIAGNOSIS — N60.19 DIFFUSE CYSTIC MASTOPATHY OF UNSPECIFIED BREAST: ICD-10-CM

## 2020-04-20 PROCEDURE — 76641 ULTRASOUND BREAST COMPLETE: CPT

## 2020-04-20 PROCEDURE — 76641 ULTRASOUND BREAST COMPLETE: CPT | Mod: 26,50

## 2020-04-20 PROCEDURE — 77063 BREAST TOMOSYNTHESIS BI: CPT | Mod: 26

## 2020-04-20 PROCEDURE — 77063 BREAST TOMOSYNTHESIS BI: CPT

## 2020-04-20 PROCEDURE — 77067 SCR MAMMO BI INCL CAD: CPT | Mod: 26

## 2020-04-20 PROCEDURE — 77067 SCR MAMMO BI INCL CAD: CPT

## 2020-05-01 ENCOUNTER — APPOINTMENT (OUTPATIENT)
Dept: INTERNAL MEDICINE | Facility: CLINIC | Age: 45
End: 2020-05-01

## 2020-05-05 ENCOUNTER — APPOINTMENT (OUTPATIENT)
Dept: INTERNAL MEDICINE | Facility: CLINIC | Age: 45
End: 2020-05-05

## 2020-05-05 ENCOUNTER — OUTPATIENT (OUTPATIENT)
Dept: OUTPATIENT SERVICES | Facility: HOSPITAL | Age: 45
LOS: 1 days | End: 2020-05-05
Payer: MEDICAID

## 2020-05-05 DIAGNOSIS — I10 ESSENTIAL (PRIMARY) HYPERTENSION: ICD-10-CM

## 2020-05-05 DIAGNOSIS — Z22.7 LATENT TUBERCULOSIS: ICD-10-CM

## 2020-05-05 PROCEDURE — G0463: CPT

## 2020-06-10 ENCOUNTER — APPOINTMENT (OUTPATIENT)
Dept: INTERNAL MEDICINE | Facility: CLINIC | Age: 45
End: 2020-06-10

## 2020-06-12 LAB
ALBUMIN SERPL ELPH-MCNC: 4.5 G/DL
ALP BLD-CCNC: 117 U/L
ALT SERPL-CCNC: 15 U/L
ANION GAP SERPL CALC-SCNC: 12 MMOL/L
AST SERPL-CCNC: 19 U/L
BASOPHILS # BLD AUTO: 0.03 K/UL
BASOPHILS NFR BLD AUTO: 0.5 %
BILIRUB SERPL-MCNC: 0.3 MG/DL
BUN SERPL-MCNC: 11 MG/DL
CALCIUM SERPL-MCNC: 9.7 MG/DL
CANCER AG125 SERPL-ACNC: 9 U/ML
CHLORIDE SERPL-SCNC: 103 MMOL/L
CHOLEST SERPL-MCNC: 289 MG/DL
CHOLEST/HDLC SERPL: 5.9 RATIO
CO2 SERPL-SCNC: 26 MMOL/L
CREAT SERPL-MCNC: 0.55 MG/DL
EOSINOPHIL # BLD AUTO: 0.18 K/UL
EOSINOPHIL NFR BLD AUTO: 3 %
ESTIMATED AVERAGE GLUCOSE: 111 MG/DL
GLUCOSE SERPL-MCNC: 97 MG/DL
HBA1C MFR BLD HPLC: 5.5 %
HCT VFR BLD CALC: 43.3 %
HDLC SERPL-MCNC: 49 MG/DL
HGB BLD-MCNC: 14.2 G/DL
IMM GRANULOCYTES NFR BLD AUTO: 0.2 %
LDLC SERPL CALC-MCNC: 203 MG/DL
LYMPHOCYTES # BLD AUTO: 3.08 K/UL
LYMPHOCYTES NFR BLD AUTO: 51.1 %
MAN DIFF?: NORMAL
MCHC RBC-ENTMCNC: 31.1 PG
MCHC RBC-ENTMCNC: 32.8 GM/DL
MCV RBC AUTO: 94.7 FL
MONOCYTES # BLD AUTO: 0.51 K/UL
MONOCYTES NFR BLD AUTO: 8.5 %
NEUTROPHILS # BLD AUTO: 2.22 K/UL
NEUTROPHILS NFR BLD AUTO: 36.7 %
PLATELET # BLD AUTO: 349 K/UL
POTASSIUM SERPL-SCNC: 4.3 MMOL/L
PROT SERPL-MCNC: 7.2 G/DL
RBC # BLD: 4.57 M/UL
RBC # FLD: 12.4 %
SODIUM SERPL-SCNC: 141 MMOL/L
TRIGL SERPL-MCNC: 184 MG/DL
WBC # FLD AUTO: 6.03 K/UL

## 2020-09-17 ENCOUNTER — APPOINTMENT (OUTPATIENT)
Dept: INTERNAL MEDICINE | Facility: CLINIC | Age: 45
End: 2020-09-17
Payer: MEDICAID

## 2020-09-17 ENCOUNTER — LABORATORY RESULT (OUTPATIENT)
Age: 45
End: 2020-09-17

## 2020-09-17 ENCOUNTER — OUTPATIENT (OUTPATIENT)
Dept: OUTPATIENT SERVICES | Facility: HOSPITAL | Age: 45
LOS: 1 days | End: 2020-09-17
Payer: MEDICAID

## 2020-09-17 VITALS
DIASTOLIC BLOOD PRESSURE: 70 MMHG | SYSTOLIC BLOOD PRESSURE: 118 MMHG | HEIGHT: 62 IN | WEIGHT: 144 LBS | BODY MASS INDEX: 26.5 KG/M2

## 2020-09-17 DIAGNOSIS — I10 ESSENTIAL (PRIMARY) HYPERTENSION: ICD-10-CM

## 2020-09-17 DIAGNOSIS — M85.80 OTHER SPECIFIED DISORDERS OF BONE DENSITY AND STRUCTURE, UNSPECIFIED SITE: ICD-10-CM

## 2020-09-17 DIAGNOSIS — R21 RASH AND OTHER NONSPECIFIC SKIN ERUPTION: ICD-10-CM

## 2020-09-17 DIAGNOSIS — Z22.7 LATENT TUBERCULOSIS: ICD-10-CM

## 2020-09-17 PROCEDURE — 99213 OFFICE O/P EST LOW 20 MIN: CPT | Mod: GE

## 2020-09-17 PROCEDURE — G0463: CPT

## 2020-09-17 PROCEDURE — 86480 TB TEST CELL IMMUN MEASURE: CPT

## 2020-09-17 RX ORDER — GUAIFENESIN 600 MG/1
600 TABLET, EXTENDED RELEASE ORAL
Qty: 24 | Refills: 0 | Status: DISCONTINUED | COMMUNITY
Start: 2019-03-29 | End: 2020-09-17

## 2020-09-17 NOTE — PHYSICAL EXAM
[No Acute Distress] : no acute distress [Well Nourished] : well nourished [Well Developed] : well developed [Well-Appearing] : well-appearing [Normal Sclera/Conjunctiva] : normal sclera/conjunctiva [PERRL] : pupils equal round and reactive to light [EOMI] : extraocular movements intact [Normal Outer Ear/Nose] : the outer ears and nose were normal in appearance [Normal Oropharynx] : the oropharynx was normal [No Lymphadenopathy] : no lymphadenopathy [Supple] : supple [No Respiratory Distress] : no respiratory distress  [No Accessory Muscle Use] : no accessory muscle use [Clear to Auscultation] : lungs were clear to auscultation bilaterally [Normal Rate] : normal rate  [Regular Rhythm] : with a regular rhythm [Normal S1, S2] : normal S1 and S2 [No Murmur] : no murmur heard [Pedal Pulses Present] : the pedal pulses are present [No Edema] : there was no peripheral edema [Soft] : abdomen soft [Non Tender] : non-tender [Non-distended] : non-distended [No Masses] : no abdominal mass palpated [No HSM] : no HSM [Normal Bowel Sounds] : normal bowel sounds [Normal Posterior Cervical Nodes] : no posterior cervical lymphadenopathy [Normal Anterior Cervical Nodes] : no anterior cervical lymphadenopathy [No Spinal Tenderness] : no spinal tenderness [No Joint Swelling] : no joint swelling [Grossly Normal Strength/Tone] : grossly normal strength/tone [No Rash] : no rash [Coordination Grossly Intact] : coordination grossly intact [No Focal Deficits] : no focal deficits [Normal Gait] : normal gait [Normal Affect] : the affect was normal [Alert and Oriented x3] : oriented to person, place, and time [Normal Insight/Judgement] : insight and judgment were intact

## 2020-09-18 NOTE — HISTORY OF PRESENT ILLNESS
[FreeTextEntry1] : CPE [de-identified] : 45F with PMH of ovarian CA (diagnosed 2004, s/p CALVIN/BSO), fibrocystic breast disease, osteopenia, anxiety and hemorrhoids, latent tb presenting for cpe. Patient questioning about starting latent TB treatment. Had discussed with Dr. Ferreira about starting 3 month tx (INH/rifamp). Was previously tested positive on T-spot and indeterminate PPD at [facility]. CXR negative. No records in [employer]/[facility]. Has occasional allergies. No night sweats, SOB, fevers, chills. \par \par Diet: eats large meals\par Meds: allergy med, pantoprazole

## 2020-09-18 NOTE — PLAN
[FreeTextEntry1] : #Latent TB\par - quant gold today; will task gordon to f/u, if positive will advise pt to start tx \par - will prescribe INH/rifapentine/pyridoxine qwkly for 3 months\par - advised pt to f/u with me in 5 wks (needs monthly monitoring while on regimen)\par - work letter given \par \par #HLD\par -  on lipid panel 5/2020\par - discussed starting medication (statin) but pt would like to start with lifestyle changes (diet and exercise), wants to hold off on starting medication \par - will consider repeat lipid profile next visit \par \par #HCM\par - Labs: wnl from 5/2020\par - Screening: gyn referral given today; mammo 4/20/20 (BIRADS2); DEXA 2017 showed osteopenia in spine, DEXA ordered today; optometry referral given \par - Imm: flu shot will be given at [facility]\par \par RTC in 5 wks for f/u on TB meds\par D/w Dr. Bernabe\par Adilene Mei PGY3

## 2020-09-18 NOTE — ASSESSMENT
[FreeTextEntry1] : 45F with PMH of ovarian CA (diagnosed 2004, s/p CALVIN/BSO), fibrocystic breast disease, osteopenia, anxiety and hemorrhoids, latent tb p/f cpe.

## 2020-09-18 NOTE — HEALTH RISK ASSESSMENT
[No] : In the past 12 months have you used drugs other than those required for medical reasons? No [With Family] : lives with family [Employed] : employed [] : No [de-identified] :  walk, run, play volleyball   [MammogramDate] : 04/20 [BoneDensityDate] : 2017 [ColonoscopyDate] : 09/13 [de-identified] : sister  [de-identified] : work at [employer] in [place]

## 2020-09-18 NOTE — REVIEW OF SYSTEMS
[Insomnia] : insomnia [Anxiety] : anxiety [Fever] : no fever [Chills] : no chills [Vision Problems] : no vision problems [Sore Throat] : no sore throat [Chest Pain] : no chest pain [Lower Ext Edema] : no lower extremity edema [Shortness Of Breath] : no shortness of breath [Cough] : no cough [Abdominal Pain] : no abdominal pain [Nausea] : no nausea [Constipation] : no constipation [Diarrhea] : diarrhea [Vomiting] : no vomiting [Dysuria] : no dysuria [Hematuria] : no hematuria [Joint Pain] : no joint pain [Skin Rash] : no skin rash [Headache] : no headache [Depression] : no depression

## 2020-09-19 LAB
GAMMA INTERFERON BACKGROUND BLD IA-ACNC: 0.04 IU/ML — SIGNIFICANT CHANGE UP
M TB IFN-G BLD-IMP: POSITIVE
M TB IFN-G CD4+ BCKGRND COR BLD-ACNC: 2.63 IU/ML — SIGNIFICANT CHANGE UP
M TB IFN-G CD4+CD8+ BCKGRND COR BLD-ACNC: 3.26 IU/ML — SIGNIFICANT CHANGE UP
QUANT TB PLUS MITOGEN MINUS NIL: 6.46 IU/ML — SIGNIFICANT CHANGE UP

## 2020-09-28 ENCOUNTER — OUTPATIENT (OUTPATIENT)
Dept: OUTPATIENT SERVICES | Facility: HOSPITAL | Age: 45
LOS: 1 days | End: 2020-09-28
Payer: MEDICAID

## 2020-09-28 ENCOUNTER — APPOINTMENT (OUTPATIENT)
Dept: INTERNAL MEDICINE | Facility: CLINIC | Age: 45
End: 2020-09-28

## 2020-09-28 DIAGNOSIS — I10 ESSENTIAL (PRIMARY) HYPERTENSION: ICD-10-CM

## 2020-09-28 DIAGNOSIS — Z22.7 LATENT TUBERCULOSIS: ICD-10-CM

## 2020-09-28 DIAGNOSIS — N39.0 URINARY TRACT INFECTION, SITE NOT SPECIFIED: ICD-10-CM

## 2020-09-28 PROCEDURE — G0463: CPT

## 2020-09-29 ENCOUNTER — APPOINTMENT (OUTPATIENT)
Dept: OBGYN | Facility: CLINIC | Age: 45
End: 2020-09-29
Payer: MEDICAID

## 2020-09-29 ENCOUNTER — OUTPATIENT (OUTPATIENT)
Dept: OUTPATIENT SERVICES | Facility: HOSPITAL | Age: 45
LOS: 1 days | End: 2020-09-29
Payer: MEDICAID

## 2020-09-29 VITALS — DIASTOLIC BLOOD PRESSURE: 80 MMHG | SYSTOLIC BLOOD PRESSURE: 118 MMHG | BODY MASS INDEX: 27.07 KG/M2 | WEIGHT: 148 LBS

## 2020-09-29 DIAGNOSIS — N76.0 ACUTE VAGINITIS: ICD-10-CM

## 2020-09-29 PROCEDURE — 99213 OFFICE O/P EST LOW 20 MIN: CPT | Mod: GE

## 2020-09-29 NOTE — ASSESSMENT
[FreeTextEntry1] : Ms. TREVINO is a 45 year F with a PMH of ovarian CA (diagnosed 2004, s/p CALVIN/BSO), fibrocystic breast disease, osteopenia, anxiety and hemorrhoids here for follow up. \par \par #UTI\par UA positive at home has symptoms of UTI. Will treat.\par - Will give 5 day course of Macrobid 100mg BID and follow up regarding improvement\par \par #LTBI\par CXR negative in the past. Quant TB+. History of BCG vaccine. No active symptoms. Likely LTBI\par - Extensive instruction and education on how to take medications for a total course of 3 months \par - INH 300mg 3 pills qweekly\par - Pyridoxine 50mg 1 pill qweekly\par - Rifapentin 150mg 6 pills qweekly which out of pocket cost as per pharmacy is ~$123 per month (~$369 for whole course)\par - Patient voiced she will take 10 pills total once a day in a week, she will start Saturday as we agreed to prevent any drug drug interactions with the antibiotic (Monday-Friday)\par - Has a follow up in late October with clinic will need repeat LFTs check and monitor for side effects\par - Will call patient later this week to make sure patient has all her medications\par \par \par Will RTC in October to see Dr. Mei\par \par Case d/w Dr. Hanson\par \par \par Mane Simon MD\par Internal Medicine PGY-3

## 2020-09-29 NOTE — PHYSICAL EXAM
[Appropriately responsive] : appropriately responsive [Alert] : alert [No Acute Distress] : no acute distress [No Lymphadenopathy] : no lymphadenopathy [Regular Rate Rhythm] : regular rate rhythm [No Murmurs] : no murmurs [Clear to Auscultation B/L] : clear to auscultation bilaterally [Soft] : soft [Non-tender] : non-tender [Non-distended] : non-distended [No HSM] : No HSM [No Mass] : no mass [Oriented x3] : oriented x3 [Examination Of The Breasts] : a normal appearance [No Lesions] : no lesions  [Labia Majora] : normal [Labia Minora] : normal [Normal] : normal [Dry Mucosa] : dry mucosa [Absent] : absent [Uterine Adnexae] : absent

## 2020-09-29 NOTE — HISTORY OF PRESENT ILLNESS
[TWNoteComboBox1] : [place] [FreeTextEntry1] : Ms. TREVINO is a 45 year F with a PMH of ovarian CA (diagnosed 2004, s/p CALVIN/BSO), fibrocystic breast disease, osteopenia, anxiety and hemorrhoids here for follow up.  [de-identified] : #Dysuria\par Since Friday has some burning pain, bloating feeling. Urine is cloudy. Increased urinary frequency. Drinking fluids. Urinary tract infection before in the past with similar symptoms. No hematuria, fevers, chills, abdominal pain, nausea or vomiting. Went to [facility] pharmacy yesterday bought a UA test which came out to be "purple" yielding a positive test. Had some low back pain today. Dysuria not improving with Tylenol. \par \par #LTBI\par She had a visit with Dr. Ferreira back in May and at that time recently started working in the [employer]. She had a T spot test for TB which was positive and a PPD that was indeterminate, had a CXR that was negative. She has no risk factors including not being homeless, no use of drugs, never has been incarcerated. She has had BCG in the past in [place] but has been in the [place] for the last 25 years. At that time had no obvious symptoms of active TB including hemoptysis, night sweats or weight loss. A regimen of rifapentin and INH for 3 months qweekly with first LFTs check and then start of LTB therapy. LFTs were found to be normal but did not follow up until September where she was seen in clinic and again discussed LTB therapy along with check for quant gold which is positive. Called pharmacy beforehand patient had picked up the INH and Pyridoxine but not the Priftin (insurance needs P/A). Has not started taking the medications yet. Discussed extensively regarding how to take the regimen. Patient was saying she has acid reflux and does not want to take pills daily. She is comfortable paying out of pocket for the Priftin. \par

## 2020-09-29 NOTE — REVIEW OF SYSTEMS
[Fever] : no fever [Chills] : no chills [Night Sweats] : no night sweats [Abdominal Pain] : no abdominal pain [Nausea] : no nausea [Vomiting] : no vomiting [Hematuria] : no hematuria [Vaginal Discharge] : no vaginal discharge [FreeTextEntry6] : No hemoptysis

## 2020-09-29 NOTE — REVIEW OF SYSTEMS
[Dysuria] : dysuria [Nocturia] : nocturia [Frequency] : frequency [Back Pain] : back pain [Negative] : Heme/Lymph

## 2020-10-02 DIAGNOSIS — N39.0 URINARY TRACT INFECTION, SITE NOT SPECIFIED: ICD-10-CM

## 2020-10-02 DIAGNOSIS — N95.2 POSTMENOPAUSAL ATROPHIC VAGINITIS: ICD-10-CM

## 2020-10-02 DIAGNOSIS — Z22.7 LATENT TUBERCULOSIS: ICD-10-CM

## 2020-10-22 ENCOUNTER — LABORATORY RESULT (OUTPATIENT)
Age: 45
End: 2020-10-22

## 2020-10-22 ENCOUNTER — APPOINTMENT (OUTPATIENT)
Dept: INTERNAL MEDICINE | Facility: CLINIC | Age: 45
End: 2020-10-22

## 2020-10-22 PROCEDURE — G0463: CPT

## 2020-10-22 PROCEDURE — 80053 COMPREHEN METABOLIC PANEL: CPT

## 2020-10-22 PROCEDURE — 82306 VITAMIN D 25 HYDROXY: CPT

## 2020-10-22 PROCEDURE — 36415 COLL VENOUS BLD VENIPUNCTURE: CPT

## 2020-10-23 LAB
24R-OH-CALCIDIOL SERPL-MCNC: 22.2 NG/ML — LOW (ref 30–80)
ALBUMIN SERPL ELPH-MCNC: 4.4 G/DL — SIGNIFICANT CHANGE UP (ref 3.3–5)
ALP SERPL-CCNC: 141 U/L — HIGH (ref 40–120)
ALT FLD-CCNC: 29 U/L — SIGNIFICANT CHANGE UP (ref 10–45)
ANION GAP SERPL CALC-SCNC: 13 MMOL/L — SIGNIFICANT CHANGE UP (ref 5–17)
AST SERPL-CCNC: 25 U/L — SIGNIFICANT CHANGE UP (ref 10–40)
BILIRUB SERPL-MCNC: 0.2 MG/DL — SIGNIFICANT CHANGE UP (ref 0.2–1.2)
BUN SERPL-MCNC: 10 MG/DL — SIGNIFICANT CHANGE UP (ref 7–23)
CALCIUM SERPL-MCNC: 9.3 MG/DL — SIGNIFICANT CHANGE UP (ref 8.4–10.5)
CHLORIDE SERPL-SCNC: 103 MMOL/L — SIGNIFICANT CHANGE UP (ref 96–108)
CO2 SERPL-SCNC: 24 MMOL/L — SIGNIFICANT CHANGE UP (ref 22–31)
CREAT SERPL-MCNC: 0.63 MG/DL — SIGNIFICANT CHANGE UP (ref 0.5–1.3)
GLUCOSE SERPL-MCNC: 109 MG/DL — HIGH (ref 70–99)
POTASSIUM SERPL-MCNC: 4.1 MMOL/L — SIGNIFICANT CHANGE UP (ref 3.5–5.3)
POTASSIUM SERPL-SCNC: 4.1 MMOL/L — SIGNIFICANT CHANGE UP (ref 3.5–5.3)
PROT SERPL-MCNC: 6.7 G/DL — SIGNIFICANT CHANGE UP (ref 6–8.3)
SODIUM SERPL-SCNC: 140 MMOL/L — SIGNIFICANT CHANGE UP (ref 135–145)

## 2020-11-18 ENCOUNTER — APPOINTMENT (OUTPATIENT)
Dept: OPHTHALMOLOGY | Facility: CLINIC | Age: 45
End: 2020-11-18

## 2020-11-24 ENCOUNTER — APPOINTMENT (OUTPATIENT)
Dept: RADIOLOGY | Facility: IMAGING CENTER | Age: 45
End: 2020-11-24
Payer: MEDICAID

## 2020-11-24 ENCOUNTER — OUTPATIENT (OUTPATIENT)
Dept: OUTPATIENT SERVICES | Facility: HOSPITAL | Age: 45
LOS: 1 days | End: 2020-11-24
Payer: MEDICAID

## 2020-11-24 ENCOUNTER — RESULT REVIEW (OUTPATIENT)
Age: 45
End: 2020-11-24

## 2020-11-24 DIAGNOSIS — M85.80 OTHER SPECIFIED DISORDERS OF BONE DENSITY AND STRUCTURE, UNSPECIFIED SITE: ICD-10-CM

## 2020-11-24 PROCEDURE — 77080 DXA BONE DENSITY AXIAL: CPT | Mod: 26

## 2020-11-24 PROCEDURE — 77080 DXA BONE DENSITY AXIAL: CPT

## 2020-11-25 ENCOUNTER — LABORATORY RESULT (OUTPATIENT)
Age: 45
End: 2020-11-25

## 2020-11-25 ENCOUNTER — APPOINTMENT (OUTPATIENT)
Dept: INTERNAL MEDICINE | Facility: CLINIC | Age: 45
End: 2020-11-25
Payer: MEDICAID

## 2020-11-25 ENCOUNTER — OUTPATIENT (OUTPATIENT)
Dept: OUTPATIENT SERVICES | Facility: HOSPITAL | Age: 45
LOS: 1 days | End: 2020-11-25
Payer: MEDICAID

## 2020-11-25 VITALS
DIASTOLIC BLOOD PRESSURE: 70 MMHG | BODY MASS INDEX: 26.13 KG/M2 | SYSTOLIC BLOOD PRESSURE: 115 MMHG | HEIGHT: 62 IN | WEIGHT: 142 LBS

## 2020-11-25 DIAGNOSIS — I10 ESSENTIAL (PRIMARY) HYPERTENSION: ICD-10-CM

## 2020-11-25 PROCEDURE — 36415 COLL VENOUS BLD VENIPUNCTURE: CPT

## 2020-11-25 PROCEDURE — 80053 COMPREHEN METABOLIC PANEL: CPT

## 2020-11-25 PROCEDURE — 85027 COMPLETE CBC AUTOMATED: CPT

## 2020-11-25 PROCEDURE — G0463: CPT

## 2020-11-25 PROCEDURE — 99213 OFFICE O/P EST LOW 20 MIN: CPT | Mod: GE

## 2020-11-26 LAB
ALBUMIN SERPL ELPH-MCNC: 4.4 G/DL — SIGNIFICANT CHANGE UP (ref 3.3–5)
ALP SERPL-CCNC: 121 U/L — HIGH (ref 40–120)
ALT FLD-CCNC: 29 U/L — SIGNIFICANT CHANGE UP (ref 10–45)
ANION GAP SERPL CALC-SCNC: 10 MMOL/L — SIGNIFICANT CHANGE UP (ref 5–17)
AST SERPL-CCNC: 22 U/L — SIGNIFICANT CHANGE UP (ref 10–40)
BILIRUB SERPL-MCNC: 0.4 MG/DL — SIGNIFICANT CHANGE UP (ref 0.2–1.2)
BUN SERPL-MCNC: 14 MG/DL — SIGNIFICANT CHANGE UP (ref 7–23)
CALCIUM SERPL-MCNC: 9.3 MG/DL — SIGNIFICANT CHANGE UP (ref 8.4–10.5)
CHLORIDE SERPL-SCNC: 104 MMOL/L — SIGNIFICANT CHANGE UP (ref 96–108)
CO2 SERPL-SCNC: 24 MMOL/L — SIGNIFICANT CHANGE UP (ref 22–31)
CREAT SERPL-MCNC: 0.62 MG/DL — SIGNIFICANT CHANGE UP (ref 0.5–1.3)
GLUCOSE SERPL-MCNC: 98 MG/DL — SIGNIFICANT CHANGE UP (ref 70–99)
HCT VFR BLD CALC: 42 % — SIGNIFICANT CHANGE UP (ref 34.5–45)
HGB BLD-MCNC: 13.5 G/DL — SIGNIFICANT CHANGE UP (ref 11.5–15.5)
MCHC RBC-ENTMCNC: 31.2 PG — SIGNIFICANT CHANGE UP (ref 27–34)
MCHC RBC-ENTMCNC: 32.1 GM/DL — SIGNIFICANT CHANGE UP (ref 32–36)
MCV RBC AUTO: 97 FL — SIGNIFICANT CHANGE UP (ref 80–100)
PLATELET # BLD AUTO: 329 K/UL — SIGNIFICANT CHANGE UP (ref 150–400)
POTASSIUM SERPL-MCNC: 4 MMOL/L — SIGNIFICANT CHANGE UP (ref 3.5–5.3)
POTASSIUM SERPL-SCNC: 4 MMOL/L — SIGNIFICANT CHANGE UP (ref 3.5–5.3)
PROT SERPL-MCNC: 6.7 G/DL — SIGNIFICANT CHANGE UP (ref 6–8.3)
RBC # BLD: 4.33 M/UL — SIGNIFICANT CHANGE UP (ref 3.8–5.2)
RBC # FLD: 12.3 % — SIGNIFICANT CHANGE UP (ref 10.3–14.5)
SODIUM SERPL-SCNC: 138 MMOL/L — SIGNIFICANT CHANGE UP (ref 135–145)
WBC # BLD: 4.78 K/UL — SIGNIFICANT CHANGE UP (ref 3.8–10.5)
WBC # FLD AUTO: 4.78 K/UL — SIGNIFICANT CHANGE UP (ref 3.8–10.5)

## 2020-11-26 RX ORDER — NITROFURANTOIN (MONOHYDRATE/MACROCRYSTALS) 25; 75 MG/1; MG/1
100 CAPSULE ORAL TWICE DAILY
Qty: 10 | Refills: 0 | Status: DISCONTINUED | COMMUNITY
Start: 2020-09-28 | End: 2020-11-26

## 2020-11-28 NOTE — PLAN
[FreeTextEntry1] : #Sinusitis\par - pt with sinus pressure, efrain-orbital HA, nasal congestion x 4 wks; sx consistent with sinusitis, as symptoms are similar to pt's past presentations with sinus infection\par - nontoxic appearing, hemodynamically stable, afebrile \par - will prescribe augmentin 875-125 BID x 10 days given the duration of symptoms\par - advised pt to seek immediate medical attention if sx worsen or persist despite abx, of if she develops cough/fever\par - c/w symptomatic tx as well with humidfier, saline spray, etc\par \par #Latent tb\par - currently on Priftin (rifapentine; 150mg x 6 tabs weekly), pyridoxine 50mg weekly and isoniazid 300mg x 3 tabs weekly\par - Started on 9/17/20, to be on treatment for 3 months (should be completed by mid-December)\par - will get CMP and CBC today \par - pt should be completed with tx on next visit with me in 5 wks -- requested a letter of treatment completion at that time for her employer \par \par RTC in 5 wks for F/U\par D/w Dr. Padgett\par Adilene Mei PGY3

## 2020-11-28 NOTE — REVIEW OF SYSTEMS
[Dryness] : dryness  [Nasal Discharge] : nasal discharge [Heartburn] : heartburn [Headache] : headache [Fever] : no fever [Chills] : no chills [Vision Problems] : no vision problems [Earache] : no earache [Sore Throat] : no sore throat [Chest Pain] : no chest pain [Lower Ext Edema] : no lower extremity edema [Shortness Of Breath] : no shortness of breath [Cough] : no cough [Dyspnea on Exertion] : no dyspnea on exertion [Abdominal Pain] : no abdominal pain [Nausea] : no nausea [Constipation] : no constipation [Diarrhea] : diarrhea [Vomiting] : no vomiting [Dysuria] : no dysuria [Hematuria] : no hematuria [Joint Pain] : no joint pain [Skin Rash] : no skin rash [Insomnia] : no insomnia

## 2020-11-28 NOTE — HISTORY OF PRESENT ILLNESS
[FreeTextEntry1] : f/u  [de-identified] : 46 yo F with PMH of ovarian CA (diagnosed 2004, s/p CALVIN/BSO), fibrocystic breast disease, osteopenia, anxiety and hemorrhoids, latent tb (on tx) presenting for f/u and monitoring while on latent TB treatment. Patient currently on Priftin (rifapentine; 150mg x 6 tabs weekly), pyridoxine 50mg weekly and isoniazid 300mg x 3 tabs weekly. Started on 9/17/20, to be on treatment for 3 months (should be completed by mid-December). At last visit, was prescribed vit D supplement for vit D deficiency and hydrocortisone cream for hemorrhoids. Pt says she did not start taking vit D supplement because she wanted to complete the TB treatment first. \par \par Today patient complains of fatigue, efrain-orbital headache and sinus pressure for about 4 weeks. Worse on L side than R. A/w L eye pain, dry nares (with epistaxis), and chills. Denies sore throat or cough but did bring up green phlegm yesterday. No ear pain, some itching, no purulence. Patient has tried various OTC products and symptomatic treatments including saline rinses, zyrtec (for allergies), aleve-D, flonase. She started using humidifier recently. No fevers, SOB, CP, N/V/D/C. Has had sinus infections in past treated with abx. Symptoms are overall mildly improving. Last sinus infection in 1/2019 with very similar symptoms. H/o asthma. Has nebulizer which helped symptoms.

## 2020-11-28 NOTE — PHYSICAL EXAM
[No Acute Distress] : no acute distress [Well Nourished] : well nourished [Well Developed] : well developed [Well-Appearing] : well-appearing [Normal Sclera/Conjunctiva] : normal sclera/conjunctiva [PERRL] : pupils equal round and reactive to light [EOMI] : extraocular movements intact [Normal Outer Ear/Nose] : the outer ears and nose were normal in appearance [Normal Oropharynx] : the oropharynx was normal [Normal TMs] : both tympanic membranes were normal [No Lymphadenopathy] : no lymphadenopathy [Supple] : supple [No Respiratory Distress] : no respiratory distress  [No Accessory Muscle Use] : no accessory muscle use [Clear to Auscultation] : lungs were clear to auscultation bilaterally [Normal Rate] : normal rate  [Regular Rhythm] : with a regular rhythm [Normal S1, S2] : normal S1 and S2 [No Murmur] : no murmur heard [Pedal Pulses Present] : the pedal pulses are present [No Edema] : there was no peripheral edema [No Extremity Clubbing/Cyanosis] : no extremity clubbing/cyanosis [Soft] : abdomen soft [Non Tender] : non-tender [Non-distended] : non-distended [Normal Bowel Sounds] : normal bowel sounds [Normal Posterior Cervical Nodes] : no posterior cervical lymphadenopathy [Normal Anterior Cervical Nodes] : no anterior cervical lymphadenopathy [No Spinal Tenderness] : no spinal tenderness [No Joint Swelling] : no joint swelling [Grossly Normal Strength/Tone] : grossly normal strength/tone [No Rash] : no rash [Coordination Grossly Intact] : coordination grossly intact [No Focal Deficits] : no focal deficits [Normal Gait] : normal gait [Normal Affect] : the affect was normal [Alert and Oriented x3] : oriented to person, place, and time [Normal Insight/Judgement] : insight and judgment were intact [de-identified] : feeling of 'pressure' on palpation of maxillary sinuses [de-identified] : some bulging of nasal turbinates b/l

## 2020-11-28 NOTE — ASSESSMENT
[FreeTextEntry1] : 44 yo F with PMH of ovarian CA (diagnosed 2004, s/p CALVIN/BSO), fibrocystic breast disease, osteopenia, anxiety and hemorrhoids, latent tb (on tx) presenting with acute symptoms c/w sinusitis as well as monitoring while on latent TB treatment.

## 2020-11-30 ENCOUNTER — APPOINTMENT (OUTPATIENT)
Dept: OPHTHALMOLOGY | Facility: CLINIC | Age: 45
End: 2020-11-30
Payer: MEDICAID

## 2020-11-30 ENCOUNTER — NON-APPOINTMENT (OUTPATIENT)
Age: 45
End: 2020-11-30

## 2020-11-30 PROCEDURE — 92133 CPTRZD OPH DX IMG PST SGM ON: CPT

## 2020-11-30 PROCEDURE — 92004 COMPRE OPH EXAM NEW PT 1/>: CPT

## 2020-12-04 DIAGNOSIS — J32.9 CHRONIC SINUSITIS, UNSPECIFIED: ICD-10-CM

## 2020-12-04 DIAGNOSIS — Z22.7 LATENT TUBERCULOSIS: ICD-10-CM

## 2020-12-04 DIAGNOSIS — E55.9 VITAMIN D DEFICIENCY, UNSPECIFIED: ICD-10-CM

## 2020-12-28 ENCOUNTER — APPOINTMENT (OUTPATIENT)
Dept: INTERNAL MEDICINE | Facility: CLINIC | Age: 45
End: 2020-12-28
Payer: MEDICAID

## 2020-12-28 ENCOUNTER — NON-APPOINTMENT (OUTPATIENT)
Age: 45
End: 2020-12-28

## 2020-12-28 ENCOUNTER — OUTPATIENT (OUTPATIENT)
Dept: OUTPATIENT SERVICES | Facility: HOSPITAL | Age: 45
LOS: 1 days | End: 2020-12-28
Payer: MEDICAID

## 2020-12-28 VITALS
DIASTOLIC BLOOD PRESSURE: 70 MMHG | HEART RATE: 82 BPM | OXYGEN SATURATION: 98 % | SYSTOLIC BLOOD PRESSURE: 122 MMHG | HEIGHT: 62 IN | BODY MASS INDEX: 25.95 KG/M2 | WEIGHT: 141 LBS

## 2020-12-28 DIAGNOSIS — M85.80 OTHER SPECIFIED DISORDERS OF BONE DENSITY AND STRUCTURE, UNSPECIFIED SITE: ICD-10-CM

## 2020-12-28 DIAGNOSIS — E55.9 VITAMIN D DEFICIENCY, UNSPECIFIED: ICD-10-CM

## 2020-12-28 DIAGNOSIS — Z22.7 LATENT TUBERCULOSIS: ICD-10-CM

## 2020-12-28 DIAGNOSIS — I10 ESSENTIAL (PRIMARY) HYPERTENSION: ICD-10-CM

## 2020-12-28 PROCEDURE — G0463: CPT

## 2020-12-28 PROCEDURE — 99213 OFFICE O/P EST LOW 20 MIN: CPT | Mod: GE

## 2020-12-28 RX ORDER — PYRIDOXINE HCL (VITAMIN B6) 50 MG
50 TABLET ORAL
Qty: 5 | Refills: 2 | Status: DISCONTINUED | COMMUNITY
Start: 2020-09-17 | End: 2020-12-28

## 2020-12-28 RX ORDER — AMOXICILLIN AND CLAVULANATE POTASSIUM 875; 125 MG/1; MG/1
875-125 TABLET, COATED ORAL TWICE DAILY
Qty: 20 | Refills: 0 | Status: DISCONTINUED | COMMUNITY
Start: 2020-11-25 | End: 2020-12-28

## 2020-12-28 RX ORDER — RIFAPENTINE 150 MG/1
150 TABLET, FILM COATED ORAL
Qty: 26 | Refills: 0 | Status: DISCONTINUED | COMMUNITY
Start: 2020-09-17 | End: 2020-12-28

## 2020-12-28 RX ORDER — ISONIAZID 300 MG/1
300 TABLET ORAL
Qty: 13 | Refills: 0 | Status: DISCONTINUED | COMMUNITY
Start: 2020-09-17 | End: 2020-12-28

## 2020-12-28 NOTE — PHYSICAL EXAM
[No Acute Distress] : no acute distress [Well Nourished] : well nourished [Well Developed] : well developed [Well-Appearing] : well-appearing [Normal Sclera/Conjunctiva] : normal sclera/conjunctiva [PERRL] : pupils equal round and reactive to light [EOMI] : extraocular movements intact [Normal Outer Ear/Nose] : the outer ears and nose were normal in appearance [Normal Oropharynx] : the oropharynx was normal [No Lymphadenopathy] : no lymphadenopathy [Supple] : supple [No Respiratory Distress] : no respiratory distress  [No Accessory Muscle Use] : no accessory muscle use [Clear to Auscultation] : lungs were clear to auscultation bilaterally [Normal Rate] : normal rate  [Regular Rhythm] : with a regular rhythm [Normal S1, S2] : normal S1 and S2 [No Murmur] : no murmur heard [No Edema] : there was no peripheral edema [No Extremity Clubbing/Cyanosis] : no extremity clubbing/cyanosis [Soft] : abdomen soft [Non Tender] : non-tender [Non-distended] : non-distended [No Masses] : no abdominal mass palpated [Normal Bowel Sounds] : normal bowel sounds [Normal Posterior Cervical Nodes] : no posterior cervical lymphadenopathy [Normal Anterior Cervical Nodes] : no anterior cervical lymphadenopathy [No Spinal Tenderness] : no spinal tenderness [No Joint Swelling] : no joint swelling [Grossly Normal Strength/Tone] : grossly normal strength/tone [No Rash] : no rash [Coordination Grossly Intact] : coordination grossly intact [No Focal Deficits] : no focal deficits [Normal Gait] : normal gait [Normal Affect] : the affect was normal [Alert and Oriented x3] : oriented to person, place, and time [Normal Insight/Judgement] : insight and judgment were intact

## 2020-12-29 NOTE — ASSESSMENT
[FreeTextEntry1] : 44 yo F with PMH of ovarian CA (diagnosed 2004, s/p CALVIN/BSO), fibrocystic breast disease, osteopenia (last DEXA 11/2020), anxiety and hemorrhoids, latent tb (on tx) presenting for f/u.

## 2020-12-29 NOTE — REVIEW OF SYSTEMS
[Headache] : headache [Fever] : no fever [Chills] : no chills [Vision Problems] : no vision problems [Sore Throat] : no sore throat [Chest Pain] : no chest pain [Shortness Of Breath] : no shortness of breath [Wheezing] : no wheezing [Cough] : no cough [Abdominal Pain] : no abdominal pain [Nausea] : no nausea [Constipation] : no constipation [Vomiting] : no vomiting [Dysuria] : no dysuria [Joint Pain] : no joint pain [Itching] : no itching

## 2020-12-29 NOTE — HISTORY OF PRESENT ILLNESS
[FreeTextEntry1] : latent TB treatment completion [de-identified] : 46 yo F with PMH of ovarian CA (diagnosed 2004, s/p CALVIN/BSO), fibrocystic breast disease, osteopenia (last DEXA 11/2020), anxiety and hemorrhoids, latent tb (on tx) presenting for f/u. She completed latent TB treatment on 12/21 (last Monday). Patient had complained of sinusitis symptoms (headaches and eye pain) and was prescribed augmentin 10 days. She says her symptoms mildly improved but came back after she took her TB meds on Monday. She began noticing that her symptoms were worse after taking the medications, and improved towards the end of the week.

## 2020-12-29 NOTE — PLAN
[FreeTextEntry1] : #Latent tb\par - completed treatment, letter written for her work \par - sinusitis-like symptoms seem associated with the medications and have since resolved \par \par #Osteopenia/Vit D deficiency\par - pt had DEXA on 11/2020 that showed stable osteopenia of spine \par - vit D lvl was 22.2 \par - had prescribed vit D supplement before but pt wanted to defer until after tb treatment, she is willing to start now \par \par RTC PRN \par D/w Dr. Lundy\par Adilene Mei PGY3

## 2020-12-31 DIAGNOSIS — M85.80 OTHER SPECIFIED DISORDERS OF BONE DENSITY AND STRUCTURE, UNSPECIFIED SITE: ICD-10-CM

## 2020-12-31 DIAGNOSIS — Z22.7 LATENT TUBERCULOSIS: ICD-10-CM

## 2020-12-31 DIAGNOSIS — E55.9 VITAMIN D DEFICIENCY, UNSPECIFIED: ICD-10-CM

## 2021-01-11 ENCOUNTER — NON-APPOINTMENT (OUTPATIENT)
Age: 46
End: 2021-01-11

## 2021-01-11 ENCOUNTER — APPOINTMENT (OUTPATIENT)
Dept: OPHTHALMOLOGY | Facility: CLINIC | Age: 46
End: 2021-01-11
Payer: MEDICAID

## 2021-01-11 PROCEDURE — 92083 EXTENDED VISUAL FIELD XM: CPT

## 2021-01-11 PROCEDURE — 92012 INTRM OPH EXAM EST PATIENT: CPT

## 2021-01-11 PROCEDURE — 99072 ADDL SUPL MATRL&STAF TM PHE: CPT

## 2021-01-11 PROCEDURE — 92020 GONIOSCOPY: CPT

## 2021-01-19 ENCOUNTER — EMERGENCY (EMERGENCY)
Facility: HOSPITAL | Age: 46
LOS: 1 days | Discharge: HOME | End: 2021-01-19
Attending: INTERNAL MEDICINE | Admitting: INTERNAL MEDICINE
Payer: MEDICAID

## 2021-01-19 VITALS
HEIGHT: 62 IN | RESPIRATION RATE: 18 BRPM | SYSTOLIC BLOOD PRESSURE: 128 MMHG | WEIGHT: 141.98 LBS | DIASTOLIC BLOOD PRESSURE: 65 MMHG | OXYGEN SATURATION: 99 % | HEART RATE: 98 BPM | TEMPERATURE: 98 F

## 2021-01-19 PROCEDURE — 99284 EMERGENCY DEPT VISIT MOD MDM: CPT | Mod: 25

## 2021-01-19 PROCEDURE — 96372 THER/PROPH/DIAG INJ SC/IM: CPT

## 2021-01-19 PROCEDURE — 99284 EMERGENCY DEPT VISIT MOD MDM: CPT

## 2021-01-19 RX ORDER — FAMOTIDINE 10 MG/ML
1 INJECTION INTRAVENOUS
Qty: 20 | Refills: 0
Start: 2021-01-19 | End: 2021-01-28

## 2021-01-19 RX ORDER — DIPHENHYDRAMINE HCL 50 MG
1 CAPSULE ORAL
Qty: 30 | Refills: 0
Start: 2021-01-19

## 2021-01-19 RX ORDER — DIPHENHYDRAMINE HCL 50 MG
50 CAPSULE ORAL ONCE
Refills: 0 | Status: COMPLETED | OUTPATIENT
Start: 2021-01-19 | End: 2021-01-19

## 2021-01-19 RX ORDER — DEXAMETHASONE 0.5 MG/5ML
10 ELIXIR ORAL ONCE
Refills: 0 | Status: COMPLETED | OUTPATIENT
Start: 2021-01-19 | End: 2021-01-19

## 2021-01-19 RX ORDER — FAMOTIDINE 10 MG/ML
20 INJECTION INTRAVENOUS ONCE
Refills: 0 | Status: COMPLETED | OUTPATIENT
Start: 2021-01-19 | End: 2021-01-19

## 2021-01-19 RX ADMIN — Medication 50 MILLIGRAM(S): at 09:27

## 2021-01-19 RX ADMIN — FAMOTIDINE 20 MILLIGRAM(S): 10 INJECTION INTRAVENOUS at 09:26

## 2021-01-19 RX ADMIN — Medication 10 MILLIGRAM(S): at 09:27

## 2021-01-19 NOTE — ED ADULT NURSE NOTE - OBJECTIVE STATEMENT
Pt presents to ER with chief complaint of allergic reaction s/p covid vaccine. Pt states this was her first IM dose of Pfizer Covid vaccine and while she was driving home from work, she began to feel her thoat closing and swelling to her posterior throat. Pt denies allergies to medications but states that she has had a reaction (swelling and redness when she received her tetanus vaccine 4 years ago. Pt is AAOx4 no signs of stridor, rash, hives, edema or erythema externally, no tongue swelling noted.

## 2021-01-19 NOTE — ED PROVIDER NOTE - ENMT, MLM
Airway patent, Nasal mucosa clear. Mouth with normal mucosa. Throat has no vesicles, no oropharyngeal exudates and uvula is midline. mild pharyngeal erythema

## 2021-01-19 NOTE — ED PROVIDER NOTE - OBJECTIVE STATEMENT
onset gradual   locations throat   duration 1-2 h   characteristics pain L arm scratchy throat  context got covid vaccine today at [facility]  aggravating factors covid vaccine got today  relieving factors none  timming constnt  severity mild

## 2021-01-19 NOTE — ED PROVIDER NOTE - PATIENT PORTAL LINK FT
You can access the FollowMyHealth Patient Portal offered by [facility] by registering at the following website: [email or web address]. By joining [employer]’s FollowMyHealth portal, you will also be able to view your health information using other applications (apps) compatible with our system.

## 2021-01-19 NOTE — ED PROVIDER NOTE - CLINICAL SUMMARY MEDICAL DECISION MAKING FREE TEXT BOX
onset gradual   locations throat   duration 1-2 h   characteristics pain L arm scratchy throat  [facility]  aggravating factors covid vaccine got today  relieving factors none  timming constnt  severity mild  decadron pepcid benadryl in ed with improvement

## 2021-01-19 NOTE — ED ADULT TRIAGE NOTE - SOURCE OF INFORMATION
Called and informed patient's mother prescription was complete and will be up at registration for .   Patient

## 2021-03-04 ENCOUNTER — NON-APPOINTMENT (OUTPATIENT)
Age: 46
End: 2021-03-04

## 2021-03-04 ENCOUNTER — TRANSCRIPTION ENCOUNTER (OUTPATIENT)
Age: 46
End: 2021-03-04

## 2021-03-09 ENCOUNTER — APPOINTMENT (OUTPATIENT)
Dept: INTERNAL MEDICINE | Facility: CLINIC | Age: 46
End: 2021-03-09
Payer: MEDICAID

## 2021-03-09 ENCOUNTER — OUTPATIENT (OUTPATIENT)
Dept: OUTPATIENT SERVICES | Facility: HOSPITAL | Age: 46
LOS: 1 days | End: 2021-03-09
Payer: MEDICAID

## 2021-03-09 ENCOUNTER — LABORATORY RESULT (OUTPATIENT)
Age: 46
End: 2021-03-09

## 2021-03-09 VITALS
HEIGHT: 62 IN | OXYGEN SATURATION: 99 % | HEART RATE: 90 BPM | BODY MASS INDEX: 26.31 KG/M2 | SYSTOLIC BLOOD PRESSURE: 118 MMHG | DIASTOLIC BLOOD PRESSURE: 70 MMHG | WEIGHT: 143 LBS

## 2021-03-09 VITALS — TEMPERATURE: 99.2 F

## 2021-03-09 DIAGNOSIS — I10 ESSENTIAL (PRIMARY) HYPERTENSION: ICD-10-CM

## 2021-03-09 DIAGNOSIS — K59.00 CONSTIPATION, UNSPECIFIED: ICD-10-CM

## 2021-03-09 DIAGNOSIS — K64.9 UNSPECIFIED HEMORRHOIDS: ICD-10-CM

## 2021-03-09 PROCEDURE — 87800 DETECT AGNT MULT DNA DIREC: CPT

## 2021-03-09 PROCEDURE — 83690 ASSAY OF LIPASE: CPT

## 2021-03-09 PROCEDURE — G0463: CPT

## 2021-03-09 PROCEDURE — 36415 COLL VENOUS BLD VENIPUNCTURE: CPT

## 2021-03-09 PROCEDURE — 99213 OFFICE O/P EST LOW 20 MIN: CPT | Mod: GE

## 2021-03-09 PROCEDURE — 80053 COMPREHEN METABOLIC PANEL: CPT

## 2021-03-09 PROCEDURE — 85027 COMPLETE CBC AUTOMATED: CPT

## 2021-03-10 LAB
ALBUMIN SERPL ELPH-MCNC: 4.6 G/DL — SIGNIFICANT CHANGE UP (ref 3.3–5)
ALP SERPL-CCNC: 113 U/L — SIGNIFICANT CHANGE UP (ref 40–120)
ALT FLD-CCNC: 14 U/L — SIGNIFICANT CHANGE UP (ref 10–45)
ANION GAP SERPL CALC-SCNC: 11 MMOL/L — SIGNIFICANT CHANGE UP (ref 5–17)
AST SERPL-CCNC: 19 U/L — SIGNIFICANT CHANGE UP (ref 10–40)
BILIRUB SERPL-MCNC: 0.4 MG/DL — SIGNIFICANT CHANGE UP (ref 0.2–1.2)
BUN SERPL-MCNC: 9 MG/DL — SIGNIFICANT CHANGE UP (ref 7–23)
CALCIUM SERPL-MCNC: 9.4 MG/DL — SIGNIFICANT CHANGE UP (ref 8.4–10.5)
CANDIDA AB TITR SER: SIGNIFICANT CHANGE UP
CHLORIDE SERPL-SCNC: 103 MMOL/L — SIGNIFICANT CHANGE UP (ref 96–108)
CO2 SERPL-SCNC: 25 MMOL/L — SIGNIFICANT CHANGE UP (ref 22–31)
CREAT SERPL-MCNC: 0.56 MG/DL — SIGNIFICANT CHANGE UP (ref 0.5–1.3)
G VAGINALIS DNA SPEC QL NAA+PROBE: SIGNIFICANT CHANGE UP
GLUCOSE SERPL-MCNC: 99 MG/DL — SIGNIFICANT CHANGE UP (ref 70–99)
HCT VFR BLD CALC: 41.9 % — SIGNIFICANT CHANGE UP (ref 34.5–45)
HGB BLD-MCNC: 14 G/DL — SIGNIFICANT CHANGE UP (ref 11.5–15.5)
LIDOCAIN IGE QN: 17 U/L — SIGNIFICANT CHANGE UP (ref 13–60)
MCHC RBC-ENTMCNC: 31.8 PG — SIGNIFICANT CHANGE UP (ref 27–34)
MCHC RBC-ENTMCNC: 33.4 GM/DL — SIGNIFICANT CHANGE UP (ref 32–36)
MCV RBC AUTO: 95.2 FL — SIGNIFICANT CHANGE UP (ref 80–100)
PLATELET # BLD AUTO: 331 K/UL — SIGNIFICANT CHANGE UP (ref 150–400)
POTASSIUM SERPL-MCNC: 4 MMOL/L — SIGNIFICANT CHANGE UP (ref 3.5–5.3)
POTASSIUM SERPL-SCNC: 4 MMOL/L — SIGNIFICANT CHANGE UP (ref 3.5–5.3)
PROT SERPL-MCNC: 7.1 G/DL — SIGNIFICANT CHANGE UP (ref 6–8.3)
RBC # BLD: 4.4 M/UL — SIGNIFICANT CHANGE UP (ref 3.8–5.2)
RBC # FLD: 12.5 % — SIGNIFICANT CHANGE UP (ref 10.3–14.5)
SODIUM SERPL-SCNC: 139 MMOL/L — SIGNIFICANT CHANGE UP (ref 135–145)
T VAGINALIS SPEC QL WET PREP: SIGNIFICANT CHANGE UP
WBC # BLD: 4.37 K/UL — SIGNIFICANT CHANGE UP (ref 3.8–10.5)
WBC # FLD AUTO: 4.37 K/UL — SIGNIFICANT CHANGE UP (ref 3.8–10.5)

## 2021-03-10 NOTE — HISTORY OF PRESENT ILLNESS
[FreeTextEntry8] : 44 yo F with PMH of ovarian CA (diagnosed 2004, s/p CALVIN/BSO), fibrocystic breast disease, osteopenia (last DEXA 11/2020), anxiety and hemorrhoids, latent tb (s/p tx) presenting for acute visit for UTI and abd pain. \par \par On 3/4, pt had called clinic "reporting 8/10 abdominal pain and due to her h/o ovarian cancer, she is scared that something is seriously wrong." Pt also had 1 wk h/o dysuria and urgency frequency. Also endorsed chronic constipation, hemorrhoids, and blood with wiping. Also had persistent reflux symptoms. At that time she was prescribed macrobid 100mg BID x 5 days, simethicone and miralax for constipation. \par \par Today she states she has been constipated. About 2.5 wks ago used a glycerin suppository. pushed very hard, had some rectal bleeding, maybe 2/2 hemorrhoids. Then 2 days later, started having discomfort in LLQ and dysuria. She Drank cranberry juice. Gastritis/acid reflux acting up. Epigastric pain.burning, feels like ulcer acting up. Took PPI. Gas/bloating. Keep urinating. Urgent care in [place]. UA and UCx was negative. Pt also mentions reaction to COVID vaccine (first dose about 1-2 months ago) -- felt like throat was closing. Received IM injection (Epi?) and benadryl. Able to breath but still had sore throat. Had 2 wks of symptoms. Took benadryl every day.\par \par #Dysuria\par - 2wks of  symptoms now; with urinary hesitancy, urgency, burning, frequency \par - drinks water -> bladder feels full -> feels urgency and burning -> a little urine comes out/frequency \par - a little bit of vaginal itching \par - took aleve for the discomfort x1 with relief (Tylenol not helpful) \par - tiny bit of improvement over all \par - macrobid -- took 1 pill last night \par - no F/C/N/V/flank pain\par - not sexually active, no vaginal discharge \par \par #Gastritis/Dyspepsia/Reflux \par - has been drinking milk and taking PPI (Pantoprazole 40mg once a day) \par - famotidine does not help \par - no nausea/vomiting/diarrhea \par - no more blood noted in stools or with wiping\par \par #Constipation\par - has been taking miralax, improving BMs, yellow-colored stool\par

## 2021-03-10 NOTE — REVIEW OF SYSTEMS
[Abdominal Pain] : abdominal pain [Constipation] : constipation [Heartburn] : heartburn [Dysuria] : dysuria [Frequency] : frequency [Fever] : no fever [Chills] : no chills [Recent Change In Weight] : ~T no recent weight change [Vision Problems] : no vision problems [Sore Throat] : no sore throat [Chest Pain] : no chest pain [Shortness Of Breath] : no shortness of breath [Cough] : no cough [Nausea] : no nausea [Diarrhea] : diarrhea [Vomiting] : no vomiting [Melena] : no melena [Incontinence] : no incontinence [Nocturia] : no nocturia [Hematuria] : no hematuria [Vaginal Discharge] : no vaginal discharge [Back Pain] : no back pain [Skin Rash] : no skin rash [Headache] : no headache

## 2021-03-10 NOTE — PHYSICAL EXAM
[No Acute Distress] : no acute distress [Well Nourished] : well nourished [Well Developed] : well developed [Well-Appearing] : well-appearing [Normal Sclera/Conjunctiva] : normal sclera/conjunctiva [EOMI] : extraocular movements intact [Normal Outer Ear/Nose] : the outer ears and nose were normal in appearance [No Lymphadenopathy] : no lymphadenopathy [Supple] : supple [No Respiratory Distress] : no respiratory distress  [No Accessory Muscle Use] : no accessory muscle use [Clear to Auscultation] : lungs were clear to auscultation bilaterally [Normal Rate] : normal rate  [Regular Rhythm] : with a regular rhythm [Normal S1, S2] : normal S1 and S2 [No Murmur] : no murmur heard [Pedal Pulses Present] : the pedal pulses are present [No Edema] : there was no peripheral edema [Soft] : abdomen soft [Non-distended] : non-distended [No Masses] : no abdominal mass palpated [No HSM] : no HSM [Normal Bowel Sounds] : normal bowel sounds [External Female Genitalia] : normal external genitalia [Vagina] : normal vaginal exam [Normal Posterior Cervical Nodes] : no posterior cervical lymphadenopathy [Normal Anterior Cervical Nodes] : no anterior cervical lymphadenopathy [No CVA Tenderness] : no CVA  tenderness [No Spinal Tenderness] : no spinal tenderness [No Joint Swelling] : no joint swelling [Grossly Normal Strength/Tone] : grossly normal strength/tone [No Rash] : no rash [No Focal Deficits] : no focal deficits [Normal Gait] : normal gait [Alert and Oriented x3] : oriented to person, place, and time [Normal Insight/Judgement] : insight and judgment were intact [de-identified] : mild LLQ discomfort and mild epigastric discomfort with deep palpation, no rebound or guarding [de-identified] : suprapubic discomfort/pressure [FreeTextEntry1] : no rash/irritation noted around vagina, no discharge [de-identified] : became tearful at times

## 2021-03-10 NOTE — PLAN
[FreeTextEntry1] : \par #Dysuria\par - symptoms sound very typical of UTI despite recent negative UA and urine culture (3/3 at Urgent Care)\par - will repeat UA and Ucx as symptoms are persistent\par - advised pt to c/w macrobid and complete course, advised to follow up if symptoms worsen or new arise\par - BV panel sent \par - exam not consistent with yeast infection \par - no recent sexual activity, much less likely STI/STD\par \par #Gastritis/Dyspepsia/Reflux + LLQ pain\par - advised pt to avoid NSAIDs if possible\par - GI referral given as pt also had BRBPR before (although most likely in setting of hemorrhoids)\par - will check CBC, CMP, and lipase as pt had some epigastric discomfort on exam\par - if labs abnormal or pt's symptoms worse or do not resolve, will order CT A/P w/PO contrast.  \par - c/w simethicone and miralax as needed, c/w PPI 40 mg daily \par \par RTC in 1 wk for follow up\par D/w Dr. Wallis\par Adilene Mei PGY3

## 2021-03-10 NOTE — ASSESSMENT
[FreeTextEntry1] : \par 46 yo F with PMH of ovarian CA (diagnosed 2004, s/p CALVIN/BSO), fibrocystic breast disease, osteopenia (last DEXA 11/2020), anxiety and hemorrhoids, latent tb (s/p tx) presenting for acute visit for UTI and abd pain.

## 2021-03-11 DIAGNOSIS — R10.9 UNSPECIFIED ABDOMINAL PAIN: ICD-10-CM

## 2021-03-11 DIAGNOSIS — K64.9 UNSPECIFIED HEMORRHOIDS: ICD-10-CM

## 2021-03-11 DIAGNOSIS — K59.00 CONSTIPATION, UNSPECIFIED: ICD-10-CM

## 2021-03-11 DIAGNOSIS — R30.0 DYSURIA: ICD-10-CM

## 2021-07-21 ENCOUNTER — TRANSCRIPTION ENCOUNTER (OUTPATIENT)
Age: 46
End: 2021-07-21

## 2021-07-21 ENCOUNTER — NON-APPOINTMENT (OUTPATIENT)
Age: 46
End: 2021-07-21

## 2021-07-26 ENCOUNTER — NON-APPOINTMENT (OUTPATIENT)
Age: 46
End: 2021-07-26

## 2021-08-16 ENCOUNTER — NON-APPOINTMENT (OUTPATIENT)
Age: 46
End: 2021-08-16

## 2021-08-16 ENCOUNTER — APPOINTMENT (OUTPATIENT)
Dept: OPHTHALMOLOGY | Facility: CLINIC | Age: 46
End: 2021-08-16
Payer: COMMERCIAL

## 2021-08-16 PROCEDURE — 92133 CPTRZD OPH DX IMG PST SGM ON: CPT

## 2021-08-16 PROCEDURE — 92012 INTRM OPH EXAM EST PATIENT: CPT

## 2021-08-27 ENCOUNTER — NON-APPOINTMENT (OUTPATIENT)
Age: 46
End: 2021-08-27

## 2021-09-01 ENCOUNTER — NON-APPOINTMENT (OUTPATIENT)
Age: 46
End: 2021-09-01

## 2021-09-01 ENCOUNTER — APPOINTMENT (OUTPATIENT)
Dept: PEDIATRIC ALLERGY IMMUNOLOGY | Facility: CLINIC | Age: 46
End: 2021-09-01
Payer: COMMERCIAL

## 2021-09-01 PROCEDURE — 99203 OFFICE O/P NEW LOW 30 MIN: CPT | Mod: 95

## 2021-09-14 ENCOUNTER — NON-APPOINTMENT (OUTPATIENT)
Age: 46
End: 2021-09-14

## 2021-09-14 ENCOUNTER — APPOINTMENT (OUTPATIENT)
Dept: PEDIATRIC ALLERGY IMMUNOLOGY | Facility: CLINIC | Age: 46
End: 2021-09-14
Payer: COMMERCIAL

## 2021-09-14 VITALS
TEMPERATURE: 96.1 F | SYSTOLIC BLOOD PRESSURE: 126 MMHG | OXYGEN SATURATION: 98 % | HEART RATE: 80 BPM | BODY MASS INDEX: 26.68 KG/M2 | HEIGHT: 62 IN | WEIGHT: 145 LBS | DIASTOLIC BLOOD PRESSURE: 69 MMHG

## 2021-09-14 PROCEDURE — 99213 OFFICE O/P EST LOW 20 MIN: CPT | Mod: 25

## 2021-09-14 PROCEDURE — 94060 EVALUATION OF WHEEZING: CPT

## 2021-09-16 ENCOUNTER — APPOINTMENT (OUTPATIENT)
Dept: PEDIATRIC ALLERGY IMMUNOLOGY | Facility: CLINIC | Age: 46
End: 2021-09-16

## 2021-09-19 NOTE — HISTORY OF PRESENT ILLNESS
[de-identified] : Pt got COVID vaccine in January 2020:\par \par Pt presents today for spirometry.\par Gets winded when she goes up stairs - tires easily. Stops after 2-3 steps.\par No nocturnal cough. No daytime cough.\par No recent  wheezing. Allergies can cause wheezing. \par No chest tightness.\par \par 9/1/2021:\par 25 minutes later she had some mucous/post-nasal drip - cleared with sips of water.\par Drove home and while she was driving she started to feel throat symptoms again.\par \par 1 hour later she had symptoms of "anaphylaxis."\par Felt throat closure sensation felt like she was suffocating.  Trouble breathing\par She was given an injection that gave her some relief but still felt like her throat was closing.\par Then was given Benadryl.\par Drove to the ED in [place]. \par Decadron, pepcid, benadryl.\par Suffocation sensation lasted 2 weeks. Took albuterol nebs which helped.\par Remote hx of reflux - currently well-controlled since she works nights.\par Took prednisone for a few days\par \par Did you receive the Moderna or Pfizer vaccine?  Pfizer\par Have you ever had a?previous?allergic/anaphylactic reaction for which an EpiPen?(or other epinephrine autoinjector)?was required??? \par NO\par Do you have any history of allergies to medications/drugs?? \par NO\par Have you ever had a reaction to injectable steroids or any other injectable medication? \par Never had\par Do you have any history of allergic reactions to vaccines??? \par Tetanus - swelling of her arm at the injection site\par Do you have any history of allergies to foods???\par NO \par Do you have any history of allergy to IV contrast??? \par NO\par Do you have any history of allergic reaction to bee sting?or other stinging insects?? \par NO\par Do you have any history of environmental allergies/hay fever??? \par yes - cat and grass\par Do you have any?history of asthma??? \par yes - worse in the winter\par Do you have any?history of?eczema or?atopic dermatitis??? \par YES\par Do you have any?history of contact dermatitis??? \par NO\par Do you have any?history of hives?(urticaria)?or angioedema?? \par NO\par Do you have any?history of a mast cell disorder??? \par NO\par Do you have any?history of any other allergic reaction of any kind??? \par NO\par Have you ever had a colonoscopy???When was your last colonoscopy? \par YES - \par Have you had a reaction to preparation for colonoscopy? When? \par Have you ever used?Miralax?(polyethylene glycol) or another laxative?? When was your last use? \par YES - last use was 3 months ago\par Do you have any cosmetic fillers? When was the last procedure? \par NO\par Were you infected with?SARS-COV2?to your knowledge?(Have you ever had COVID-19)??? \par NO\par Have you received passive antibody therapy (monoclonal antibodies or convalescent serum) as treatment for COVID-19?? \par Do you take a beta-blocker??? \par NO\par Do you take an ACE inhibitor?? \par NO\par Do you use NSAIDs?? Did you take NSAIDs within 24 hours of your vaccine?? \par NO\par Do you have a history of vasovagal reactions (fainting after blood draw, shots or other situations)??? \par NO\par Do you have a history of anxiety or panic attacks??? \par YES\par What is your occupation? \par Works at [employer]\par Do you work with or have a hobby working with automobiles? \par What was the timing of your reaction? How soon did you feel any symptoms??? 25 minutes, postnasal drip. 1 hour later throat closure sensation.\par What were your symptoms?after receiving the COVID-19 vaccine?in chronological order??? \par Did you feel any:?? \par Itchiness? NO\par Rash/urticaria? NO \par flushing NO\par Swelling? NO\par Difficulty swallowing/lump in throat?  no trouble swallowing, maybe lump in the throat sensation\par Shortness of breath/wheezing/cough?? yes - +trouble breathing, no cough, no wheeze\par Abdominal pain/cramping/vomiting/diarrhea? no\par Dizziness/Lightheadedness/Fainting/Loss of Consciousness? \par Impending sense of doom? yes\par What medications were you treated with??? \par Benadryl (or H1 blocker); which one?? yes\par Pepcid (or another H2 blocker); which one?? yes\par Steroids (PO/IV/IM); which one?? yes - decadron\par Montelukast? no\par Epinephrine? no

## 2021-09-19 NOTE — PHYSICAL EXAM
[Alert] : alert [Well Nourished] : well nourished [Healthy Appearance] : healthy appearance [No Acute Distress] : no acute distress [Well Developed] : well developed [Normal Pupil & Iris Size/Symmetry] : normal pupil and iris size and symmetry [No Discharge] : no discharge [No Photophobia] : no photophobia [Sclera Not Icteric] : sclera not icteric [Normal TMs] : both tympanic membranes were normal [Normal Nasal Mucosa] : the nasal mucosa was normal [Normal Lips/Tongue] : the lips and tongue were normal [Normal Outer Ear/Nose] : the ears and nose were normal in appearance [Normal Tonsils] : normal tonsils [No Thrush] : no thrush [Supple] : the neck was supple [Normal Rate and Effort] : normal respiratory rhythm and effort [No Crackles] : no crackles [No Retractions] : no retractions [Bilateral Audible Breath Sounds] : bilateral audible breath sounds [Normal Rate] : heart rate was normal  [Normal S1, S2] : normal S1 and S2 [Regular Rhythm] : with a regular rhythm [No murmur] : no murmur [Soft] : abdomen soft [Not Tender] : non-tender [Not Distended] : not distended [No HSM] : no hepato-splenomegaly [Normal Cervical Lymph Nodes] : cervical [Skin Intact] : skin intact  [No Rash] : no rash [No Skin Lesions] : no skin lesions [No clubbing] : no clubbing [No Edema] : no edema [No Cyanosis] : no cyanosis [Normal Mood] : mood was normal [Normal Affect] : affect was normal [Alert, Awake, Oriented as Age-Appropriate] : alert, awake, oriented as age appropriate [Pale mucosa] : no pale mucosa

## 2021-09-19 NOTE — CONSULT LETTER
[Dear  ___] : Dear  [unfilled], [Consult Letter:] : I had the pleasure of evaluating your patient, [unfilled]. [Consult Closing:] : Thank you very much for allowing me to participate in the care of this patient.  If you have any questions, please do not hesitate to contact me. [Please see my note below.] : Please see my note below. [Sincerely,] : Sincerely, [FreeTextEntry2] : Adilene Mei MD [FreeTextEntry3] : Kirti Phelps MD\par Attending Physician \par Division of Allergy/Immunology \par [facility] \par \par  of Medicine and Pediatrics\par [facility] \par \par [address]\[place] [address]\par Tel: [phone]\par Fax: [phone]\par Email: [email or web address]\par \par \par \par

## 2021-09-19 NOTE — HISTORY OF PRESENT ILLNESS
[Home] : at home, [unfilled] , at the time of the visit. [Other Location: e.g. Home (Enter Location, City,State)___] : at [unfilled] [Verbal consent obtained from patient] : the patient, [unfilled] [de-identified] : Pt got COVID vaccine in January 2020:\par \par 25 minutes later she had some mucous/post-nasal drip - cleared with sips of water.\par Drove home and while she was driving she started to feel throat symptoms again.\par \par 1 hour later she had symptoms of "anaphylaxis."\par Felt throat closure sensation felt like she was suffocating.  Trouble breathing\par She was given an injection that gave her some relief but still felt like her throat was closing.\par Then was given Benadryl.\par Drove to the ED in [place]. \par Decadron, pepcid, benadryl.\par Suffocation sensation lasted 2 weeks. Took albuterol nebs which helped.\par Remote hx of reflux - currently well-controlled since she works nights.\par Took prednisone for a few days\par \par Did you receive the Moderna or Pfizer vaccine?  Pfizer\par Have you ever had a?previous?allergic/anaphylactic reaction for which an EpiPen?(or other epinephrine autoinjector)?was required??? \par NO\par Do you have any history of allergies to medications/drugs?? \par NO\par Have you ever had a reaction to injectable steroids or any other injectable medication? \par Never had\par Do you have any history of allergic reactions to vaccines??? \par Tetanus - swelling of her arm at the injection site\par Do you have any history of allergies to foods???\par NO \par Do you have any history of allergy to IV contrast??? \par NO\par Do you have any history of allergic reaction to bee sting?or other stinging insects?? \par NO\par Do you have any history of environmental allergies/hay fever??? \par yes - cat and grass\par Do you have any?history of asthma??? \par yes - worse in the winter\par Do you have any?history of?eczema or?atopic dermatitis??? \par YES\par Do you have any?history of contact dermatitis??? \par NO\par Do you have any?history of hives?(urticaria)?or angioedema?? \par NO\par Do you have any?history of a mast cell disorder??? \par NO\par Do you have any?history of any other allergic reaction of any kind??? \par NO\par Have you ever had a colonoscopy???When was your last colonoscopy? \par YES - \par Have you had a reaction to preparation for colonoscopy? When? \par Have you ever used?Miralax?(polyethylene glycol) or another laxative?? When was your last use? \par YES - last use was 3 months ago\par Do you have any cosmetic fillers? When was the last procedure? \par NO\par Were you infected with?SARS-COV2?to your knowledge?(Have you ever had COVID-19)??? \par NO\par Have you received passive antibody therapy (monoclonal antibodies or convalescent serum) as treatment for COVID-19?? \par Do you take a beta-blocker??? \par NO\par Do you take an ACE inhibitor?? \par NO\par Do you use NSAIDs?? Did you take NSAIDs within 24 hours of your vaccine?? \par NO\par Do you have a history of vasovagal reactions (fainting after blood draw, shots or other situations)??? \par NO\par Do you have a history of anxiety or panic attacks??? \par YES\par What is your occupation? \par Works at [employer]\par Do you work with or have a hobby working with automobiles? \par What was the timing of your reaction? How soon did you feel any symptoms??? 25 minutes, postnasal drip. 1 hour later throat closure sensation.\par What were your symptoms?after receiving the COVID-19 vaccine?in chronological order??? \par Did you feel any:?? \par Itchiness? NO\par Rash/urticaria? NO \par flushing NO\par Swelling? NO\par Difficulty swallowing/lump in throat?  no trouble swallowing, maybe lump in the throat sensation\par Shortness of breath/wheezing/cough?? yes - +trouble breathing, no cough, no wheeze\par Abdominal pain/cramping/vomiting/diarrhea? no\par Dizziness/Lightheadedness/Fainting/Loss of Consciousness? \par Impending sense of doom? yes\par What medications were you treated with??? \par Benadryl (or H1 blocker); which one?? yes\par Pepcid (or another H2 blocker); which one?? yes\par Steroids (PO/IV/IM); which one?? yes - decadron\par Montelukast? no\par Epinephrine? no

## 2021-09-22 ENCOUNTER — APPOINTMENT (OUTPATIENT)
Dept: PEDIATRIC ALLERGY IMMUNOLOGY | Facility: CLINIC | Age: 46
End: 2021-09-22

## 2021-09-30 ENCOUNTER — APPOINTMENT (OUTPATIENT)
Dept: PEDIATRIC ALLERGY IMMUNOLOGY | Facility: CLINIC | Age: 46
End: 2021-09-30
Payer: COMMERCIAL

## 2021-09-30 ENCOUNTER — NON-APPOINTMENT (OUTPATIENT)
Age: 46
End: 2021-09-30

## 2021-09-30 VITALS — OXYGEN SATURATION: 95 % | HEART RATE: 92 BPM

## 2021-09-30 PROCEDURE — 0002A: CPT

## 2021-09-30 PROCEDURE — 99212 OFFICE O/P EST SF 10 MIN: CPT | Mod: 25

## 2021-10-07 ENCOUNTER — APPOINTMENT (OUTPATIENT)
Dept: PEDIATRIC ALLERGY IMMUNOLOGY | Facility: CLINIC | Age: 46
End: 2021-10-07

## 2021-10-24 NOTE — REVIEW OF SYSTEMS
[Nl] : Integumentary [Fatigue] : no fatigue [Eye Discharge] : no eye discharge [Eye Redness] : no redness [Puffy Eyelids] : no puffy ~T eyelids [Bloodshot Eyes] : no bloodshot ~T eyes [Rhinorrhea] : no rhinorrhea [Snoring] : no snoring [Post Nasal Drip] : no post nasal drip [Sneezing] : no sneezing [Cough] : no cough [Wheezing Worsens With Exercise] : wheezing does not worsen with exercise [Wheezing] : no wheezing

## 2021-10-24 NOTE — END OF VISIT
[FreeTextEntry3] : HELLEN Addison has acted like a scribe on my behalf. I have reviewed the note and edited where appropriate. History, PE, assessment, and plan were personally performed by me.\par \par

## 2021-10-24 NOTE — HISTORY OF PRESENT ILLNESS
[de-identified] : This is a 46 year old female, with reaction to first dose of  COVID 19 mRna Pfizer  vaccine in January 2020, which led to anaphylaxis here for the second Pfizer vaccine. Currently she  is feeling well, little anxious about getting the second Pfizer vaccine today. \par \par She  premedicating with Zyrtec 10 yesterday plus an hour ago. Pepcid 20mg one tab yesterday and an hour ago. \par -Past two weeks on Flovent 44 two puff BID. Took albuterol two puff an hour ago. Denies any shortness of breath, chest tightness or wheezing. \par \par 9/14/21- wnl  spirometry.\par \par \par \par First dose of COVID 19 mRna Pfizer  January.\par 25 minutes later she had some mucous/post-nasal drip - cleared with sips of water.\par Drove home and while she was driving she started to feel throat symptoms again.\par 1 hour later she had symptoms of "anaphylaxis."\par Felt throat closure sensation felt like she was suffocating. Trouble breathing\par She was given an injection that gave her some relief but still felt like her throat was closing.\par Then was given Benadryl.\par Drove to the ED in [place]. \par Decadron, pepcid, benadryl.\par Suffocation sensation lasted 2 weeks. Took albuterol nebs which helped.\par Remote hx of reflux - currently well-controlled since she works nights.\par Took prednisone for a few days\par

## 2021-10-24 NOTE — PHYSICAL EXAM
[Alert] : alert [Well Nourished] : well nourished [No Acute Distress] : no acute distress [Well Developed] : well developed [Sclera Not Icteric] : sclera not icteric [Normal TMs] : both tympanic membranes were normal [Normal Rate and Effort] : normal respiratory rhythm and effort [No Crackles] : no crackles [Bilateral Audible Breath Sounds] : bilateral audible breath sounds [Skin Intact] : skin intact  [No Rash] : no rash [Normal Mood] : mood was normal [Normal Affect] : affect was normal [Judgment and Insight Age Appropriate] : judgement and insight is age appropriate [Alert, Awake, Oriented as Age-Appropriate] : alert, awake, oriented as age appropriate [Conjunctival Erythema] : no conjunctival erythema [Boggy Nasal Turbinates] : no boggy and/or pale nasal turbinates [Pharyngeal erythema] : no pharyngeal erythema [Posterior Pharyngeal Cobblestoning] : no posterior pharyngeal cobblestoning [Clear Rhinorrhea] : no clear rhinorrhea was seen [Exudate] : no exudate [Wheezing] : no wheezing was heard [Patches] : no patches

## 2021-11-07 NOTE — PHYSICAL EXAM
[Alert] : alert [Well Nourished] : well nourished [Healthy Appearance] : healthy appearance [No Acute Distress] : no acute distress [Well Developed] : well developed [Normal Voice/Communication] : normal voice communication [Normal Rate and Effort] : normal respiratory rhythm and effort [No Crackles] : no crackles [No Retractions] : no retractions [Normal Rate] : heart rate was normal  [Normal S1, S2] : normal S1 and S2 [No murmur] : no murmur [Regular Rhythm] : with a regular rhythm [No Rubs] : no pericardial rub [Wheezing] : no wheezing was heard

## 2021-11-07 NOTE — CONSULT LETTER
[Dear  ___] : Dear  [unfilled], [Consult Letter:] : I had the pleasure of evaluating your patient, [unfilled]. [Please see my note below.] : Please see my note below. [Consult Closing:] : Thank you very much for allowing me to participate in the care of this patient.  If you have any questions, please do not hesitate to contact me. [Sincerely,] : Sincerely, [FreeTextEntry2] : Adilene Mei MD [FreeTextEntry3] : Kirti Phelps MD\par Attending Physician \par Division of Allergy/Immunology \par [facility] \par \par  of Medicine and Pediatrics\par [facility] \par \par [address]\[place] [address]\par Tel: [phone]\par Fax: [phone]\par Email: [email or web address]\par \par \par \par

## 2021-11-07 NOTE — HISTORY OF PRESENT ILLNESS
[de-identified] : Pt got COVID vaccine in January 2020:\par \par Pt has been taking flovent 2 puffs BID for the past week. Feels like her breathing is a little bit easier. \par \par 9/14/21:\par Pt presents today for spirometry.\par Gets winded when she goes up stairs - tires easily. Stops after 2-3 steps.\par No nocturnal cough. No daytime cough.\par No recent  wheezing. Allergies can cause wheezing. \par No chest tightness.\par \par 9/1/2021:\par First dose in January.\par 25 minutes later she had some mucous/post-nasal drip - cleared with sips of water.\par Drove home and while she was driving she started to feel throat symptoms again.\par 1 hour later she had symptoms of "anaphylaxis."\par Felt throat closure sensation felt like she was suffocating.  Trouble breathing\par She was given an injection that gave her some relief but still felt like her throat was closing.\par Then was given Benadryl.\par Drove to the ED in [place]. \par Decadron, pepcid, benadryl.\par Suffocation sensation lasted 2 weeks. Took albuterol nebs which helped.\par Remote hx of reflux - currently well-controlled since she works nights.\par Took prednisone for a few days\par \par Did you receive the Moderna or Pfizer vaccine?  Pfizer\par Have you ever had a?previous?allergic/anaphylactic reaction for which an EpiPen?(or other epinephrine autoinjector)?was required??? \par NO\par Do you have any history of allergies to medications/drugs?? \par NO\par Have you ever had a reaction to injectable steroids or any other injectable medication? \par Never had\par Do you have any history of allergic reactions to vaccines??? \par Tetanus - swelling of her arm at the injection site\par Do you have any history of allergies to foods???\par NO \par Do you have any history of allergy to IV contrast??? \par NO\par Do you have any history of allergic reaction to bee sting?or other stinging insects?? \par NO\par Do you have any history of environmental allergies/hay fever??? \par yes - cat and grass\par Do you have any?history of asthma??? \par yes - worse in the winter\par Do you have any?history of?eczema or?atopic dermatitis??? \par YES\par Do you have any?history of contact dermatitis??? \par NO\par Do you have any?history of hives?(urticaria)?or angioedema?? \par NO\par Do you have any?history of a mast cell disorder??? \par NO\par Do you have any?history of any other allergic reaction of any kind??? \par NO\par Have you ever had a colonoscopy???When was your last colonoscopy? \par YES - \par Have you had a reaction to preparation for colonoscopy? When? \par Have you ever used?Miralax?(polyethylene glycol) or another laxative?? When was your last use? \par YES - last use was 3 months ago\par Do you have any cosmetic fillers? When was the last procedure? \par NO\par Were you infected with?SARS-COV2?to your knowledge?(Have you ever had COVID-19)??? \par NO\par Have you received passive antibody therapy (monoclonal antibodies or convalescent serum) as treatment for COVID-19?? \par Do you take a beta-blocker??? \par NO\par Do you take an ACE inhibitor?? \par NO\par Do you use NSAIDs?? Did you take NSAIDs within 24 hours of your vaccine?? \par NO\par Do you have a history of vasovagal reactions (fainting after blood draw, shots or other situations)??? \par NO\par Do you have a history of anxiety or panic attacks??? \par YES\par What is your occupation? \par Works at [employer]\par Do you work with or have a hobby working with automobiles? \par What was the timing of your reaction? How soon did you feel any symptoms??? 25 minutes, postnasal drip. 1 hour later throat closure sensation.\par What were your symptoms?after receiving the COVID-19 vaccine?in chronological order??? \par Did you feel any:?? \par Itchiness? NO\par Rash/urticaria? NO \par flushing NO\par Swelling? NO\par Difficulty swallowing/lump in throat?  no trouble swallowing, maybe lump in the throat sensation\par Shortness of breath/wheezing/cough?? yes - +trouble breathing, no cough, no wheeze\par Abdominal pain/cramping/vomiting/diarrhea? no\par Dizziness/Lightheadedness/Fainting/Loss of Consciousness? \par Impending sense of doom? yes\par What medications were you treated with??? \par Benadryl (or H1 blocker); which one?? yes\par Pepcid (or another H2 blocker); which one?? yes\par Steroids (PO/IV/IM); which one?? yes - decadron\par Montelukast? no\par Epinephrine? no

## 2021-12-25 ENCOUNTER — TRANSCRIPTION ENCOUNTER (OUTPATIENT)
Age: 46
End: 2021-12-25

## 2022-02-03 ENCOUNTER — APPOINTMENT (OUTPATIENT)
Dept: OPHTHALMOLOGY | Facility: CLINIC | Age: 47
End: 2022-02-03
Payer: COMMERCIAL

## 2022-02-03 ENCOUNTER — NON-APPOINTMENT (OUTPATIENT)
Age: 47
End: 2022-02-03

## 2022-02-03 PROCEDURE — 92012 INTRM OPH EXAM EST PATIENT: CPT

## 2022-02-03 PROCEDURE — 92083 EXTENDED VISUAL FIELD XM: CPT

## 2022-08-12 ENCOUNTER — APPOINTMENT (OUTPATIENT)
Dept: OPHTHALMOLOGY | Facility: CLINIC | Age: 47
End: 2022-08-12

## 2023-01-26 NOTE — ED PROVIDER NOTE - PSYCHIATRIC, MLM
Alert and oriented to person, place, time/situation. normal mood and affect. no apparent risk to self or others.
175.26

## 2023-02-16 ENCOUNTER — APPOINTMENT (OUTPATIENT)
Dept: INTERNAL MEDICINE | Facility: CLINIC | Age: 48
End: 2023-02-16
Payer: COMMERCIAL

## 2023-02-16 VITALS
HEIGHT: 62 IN | WEIGHT: 150 LBS | BODY MASS INDEX: 27.6 KG/M2 | HEART RATE: 96 BPM | SYSTOLIC BLOOD PRESSURE: 124 MMHG | OXYGEN SATURATION: 97 % | DIASTOLIC BLOOD PRESSURE: 78 MMHG

## 2023-02-16 DIAGNOSIS — C56.9 MALIGNANT NEOPLASM OF UNSPECIFIED OVARY: ICD-10-CM

## 2023-02-16 DIAGNOSIS — B00.9 HERPESVIRAL INFECTION, UNSPECIFIED: ICD-10-CM

## 2023-02-16 DIAGNOSIS — G47.9 SLEEP DISORDER, UNSPECIFIED: ICD-10-CM

## 2023-02-16 DIAGNOSIS — H53.8 OTHER VISUAL DISTURBANCES: ICD-10-CM

## 2023-02-16 DIAGNOSIS — Z80.8 FAMILY HISTORY OF MALIGNANT NEOPLASM OF OTHER ORGANS OR SYSTEMS: ICD-10-CM

## 2023-02-16 DIAGNOSIS — Z00.00 ENCOUNTER FOR GENERAL ADULT MEDICAL EXAMINATION W/OUT ABNORMAL FINDINGS: ICD-10-CM

## 2023-02-16 PROCEDURE — 99396 PREV VISIT EST AGE 40-64: CPT | Mod: GC

## 2023-02-16 RX ORDER — NITROFURANTOIN (MONOHYDRATE/MACROCRYSTALS) 25; 75 MG/1; MG/1
100 CAPSULE ORAL
Qty: 10 | Refills: 0 | Status: DISCONTINUED | COMMUNITY
Start: 2021-03-04 | End: 2023-02-16

## 2023-02-16 RX ORDER — FLUTICASONE PROPIONATE 50 UG/1
50 SPRAY, METERED NASAL
Qty: 1 | Refills: 0 | Status: DISCONTINUED | COMMUNITY
Start: 2019-03-29 | End: 2023-02-16

## 2023-02-16 NOTE — CURRENT MEDS
[Yes] : Reviewed medication list for presence of high-risk medications. [Benzodiazepines] : benzodiazepines [Opioids] : opioids [Blood Thinners] : blood thinners [Muscle Relaxants] : muscle relaxants [Sedatives] : sedatives

## 2023-02-16 NOTE — REVIEW OF SYSTEMS
[Dryness] : dryness  [Nosebleed] : nosebleed [Nasal Discharge] : nasal discharge [Negative] : Heme/Lymph

## 2023-02-20 NOTE — HISTORY OF PRESENT ILLNESS
[FreeTextEntry1] : CPE \par Sinus pain  [de-identified] : 48 yo F PMHx of Ovarian cancer (s/p CALVIN/BSO in 2004), fibrocystic breast disease, genital herpes, osteopenia, anxiety, hemorrhoids, allergy to COVID vaccine and tetanus toxoid, presents for CPE and sinus pain. Patient also reports blurry vision and difficulty sleeping. Patient also reports having pain during sexual intercourse with boyfriend.  \par \par #Sinus pain \par - Patient has longstanding hx of chronic sinusitis \par - Patient reports 1 week history of current episode of sinus pain \par - A/w nasal discharge, headaches, left cheek tender to palpation, occasional nose bleeds, and sensation of pressure near left eye  \par - Reports trying nasal saline sprays and oral cetirizine without improvement \par - Previously tried flonase but stopped when symptoms worsened \par \par #CPE \par - Patient has hx of allergic reaction (anaphylaxis) to COVID vaccine and tetanus vaccine \par - Patient reports getting flu vaccine in 11/2022 \par \par #Difficulty sleeping \par - Patient reports symptoms started 1 year ago when switching from night shifts to day \par - Only sleeps 5 hours at most \par - Reports occasionally uses phone at night \par - A/w stress at night \par \par #Blurry vision \par - Patient was previously seen by ophtho for blurry vision and possible cataracts \par - Patient requests a second opinion \par \par #Genital herpes \par - Patient reports genital herpes has currently resolved but is requesting a refill for valtrex \par \par #Dyspareunia \par - Patient reports hx of pain during sexual intercourse with boyfriend \par - Previously evaluated by OBGYN but patient was unable to follow-up after COVID \par - Patient requests referral \par \par Physical exam chaperoned by DINORA Blackburn.

## 2023-02-20 NOTE — HEALTH RISK ASSESSMENT
[No] : In the past 12 months have you used drugs other than those required for medical reasons? No [No falls in past year] : Patient reported no falls in the past year [1] : 1) Little interest or pleasure doing things for several days (1) [0] : 2) Feeling down, depressed, or hopeless: Not at all (0) [PHQ-2 Negative - No further assessment needed] : PHQ-2 Negative - No further assessment needed [With Family] : lives with family [# of Members in Household ___] :  household currently consist of [unfilled] member(s) [Employed] : employed [High School] : high school [Significant Other] : lives with significant other [Feels Safe at Home] : Feels safe at home [Fully functional (bathing, dressing, toileting, transferring, walking, feeding)] : Fully functional (bathing, dressing, toileting, transferring, walking, feeding) [Fully functional (using the telephone, shopping, preparing meals, housekeeping, doing laundry, using] : Fully functional and needs no help or supervision to perform IADLs (using the telephone, shopping, preparing meals, housekeeping, doing laundry, using transportation, managing medications and managing finances) [Reports changes in vision] : Reports changes in vision [Never] : Never [de-identified] : Walking at work  [de-identified] : salads, yogurt, pineapple, water, cranberry juice, rice, chicken  [[id number]] : 1 [Sexually Active] : not sexually active [Reports changes in hearing] : Reports no changes in hearing [FreeTextEntry2] : Works in labor and delivery at [employer]  [de-identified] : Blurry vision

## 2023-02-20 NOTE — PHYSICAL EXAM
[No Acute Distress] : no acute distress [Well Nourished] : well nourished [Well Developed] : well developed [Well-Appearing] : well-appearing [Normal Sclera/Conjunctiva] : normal sclera/conjunctiva [PERRL] : pupils equal round and reactive to light [EOMI] : extraocular movements intact [Normal Outer Ear/Nose] : the outer ears and nose were normal in appearance [Normal Oropharynx] : the oropharynx was normal [Normal TMs] : both tympanic membranes were normal [No JVD] : no jugular venous distention [No Lymphadenopathy] : no lymphadenopathy [Supple] : supple [Thyroid Normal, No Nodules] : the thyroid was normal and there were no nodules present [No Respiratory Distress] : no respiratory distress  [No Accessory Muscle Use] : no accessory muscle use [Clear to Auscultation] : lungs were clear to auscultation bilaterally [Normal Rate] : normal rate  [Regular Rhythm] : with a regular rhythm [Normal S1, S2] : normal S1 and S2 [No Murmur] : no murmur heard [No Carotid Bruits] : no carotid bruits [No Abdominal Bruit] : a ~M bruit was not heard ~T in the abdomen [No Varicosities] : no varicosities [Pedal Pulses Present] : the pedal pulses are present [No Edema] : there was no peripheral edema [No Palpable Aorta] : no palpable aorta [No Extremity Clubbing/Cyanosis] : no extremity clubbing/cyanosis [Declined Breast Exam] : declined breast exam  [Soft] : abdomen soft [Non Tender] : non-tender [Non-distended] : non-distended [No Masses] : no abdominal mass palpated [No HSM] : no HSM [Normal Bowel Sounds] : normal bowel sounds [Normal Posterior Cervical Nodes] : no posterior cervical lymphadenopathy [Normal Anterior Cervical Nodes] : no anterior cervical lymphadenopathy [No CVA Tenderness] : no CVA  tenderness [No Spinal Tenderness] : no spinal tenderness [No Joint Swelling] : no joint swelling [Grossly Normal Strength/Tone] : grossly normal strength/tone [No Rash] : no rash [Coordination Grossly Intact] : coordination grossly intact [No Focal Deficits] : no focal deficits [Normal Gait] : normal gait [Deep Tendon Reflexes (DTR)] : deep tendon reflexes were 2+ and symmetric [Normal Affect] : the affect was normal [Normal Insight/Judgement] : insight and judgment were intact [Fundoscopic Exam Performed] : fundoscopic ~T exam ~C was performed [de-identified] : Erythematous nasal mucosa  [de-identified] : Deferred

## 2023-02-20 NOTE — COUNSELING
[Behavioral health counseling provided] : Behavioral health counseling provided [Sleep ___ hours/day] : Sleep [unfilled] hours/day [Potential consequences of obesity discussed] : Potential consequences of obesity discussed [Benefits of weight loss discussed] : Benefits of weight loss discussed [Encouraged to increase physical activity] : Encouraged to increase physical activity [Good understanding] : Patient has a good understanding of disease, goals and obesity follow-up plan [Needs reinforcement, provided] : Patient needs reinforcement on understanding of lifestyle changes and steps needed to achieve self management goal; reinforcement was provided [FreeTextEntry4] : 15

## 2023-02-20 NOTE — ASSESSMENT
[FreeTextEntry1] : #HCM \par - Send GI referral for screening colonoscopy \par - Send OBGYN referral for annual examination \par - Send ophthalmology referral for blurry vision \par - Send mammogram and ultrasound breast referral for hx of dense breast tissue requiring ultrasound \par - Defer Tdap booster due to hx of allergy to tetanus toxoid \par - Send CBC, CMP, lipid panel, a1c \par - Follow-up for CPE \par \par Case discussed with Dr. Lewis

## 2023-02-23 DIAGNOSIS — E78.5 HYPERLIPIDEMIA, UNSPECIFIED: ICD-10-CM

## 2023-03-20 LAB
ALBUMIN SERPL ELPH-MCNC: 4.1 G/DL
ALP BLD-CCNC: 128 U/L
ALT SERPL-CCNC: 18 U/L
ANION GAP SERPL CALC-SCNC: 12 MMOL/L
AST SERPL-CCNC: 22 U/L
BASOPHILS # BLD AUTO: 0.03 K/UL
BASOPHILS NFR BLD AUTO: 0.5 %
BILIRUB SERPL-MCNC: 0.4 MG/DL
BUN SERPL-MCNC: 12 MG/DL
CALCIUM SERPL-MCNC: 9.5 MG/DL
CANCER AG125 SERPL-ACNC: 9 U/ML
CHLORIDE SERPL-SCNC: 105 MMOL/L
CHOLEST SERPL-MCNC: 295 MG/DL
CO2 SERPL-SCNC: 23 MMOL/L
CREAT SERPL-MCNC: 0.58 MG/DL
EGFR: 111 ML/MIN/1.73M2
EOSINOPHIL # BLD AUTO: 0.09 K/UL
EOSINOPHIL NFR BLD AUTO: 1.4 %
ESTIMATED AVERAGE GLUCOSE: 123 MG/DL
GLUCOSE SERPL-MCNC: 100 MG/DL
HBA1C MFR BLD HPLC: 5.9 %
HCT VFR BLD CALC: 43.1 %
HDLC SERPL-MCNC: 49 MG/DL
HGB BLD-MCNC: 14.4 G/DL
IMM GRANULOCYTES NFR BLD AUTO: 0.3 %
LDLC SERPL CALC-MCNC: 216 MG/DL
LYMPHOCYTES # BLD AUTO: 2.45 K/UL
LYMPHOCYTES NFR BLD AUTO: 39.3 %
MAN DIFF?: NORMAL
MCHC RBC-ENTMCNC: 31.7 PG
MCHC RBC-ENTMCNC: 33.4 GM/DL
MCV RBC AUTO: 94.9 FL
MONOCYTES # BLD AUTO: 0.49 K/UL
MONOCYTES NFR BLD AUTO: 7.9 %
NEUTROPHILS # BLD AUTO: 3.16 K/UL
NEUTROPHILS NFR BLD AUTO: 50.6 %
NONHDLC SERPL-MCNC: 245 MG/DL
PLATELET # BLD AUTO: 317 K/UL
POTASSIUM SERPL-SCNC: 4.4 MMOL/L
PROT SERPL-MCNC: 7 G/DL
RBC # BLD: 4.54 M/UL
RBC # FLD: 12.9 %
SODIUM SERPL-SCNC: 140 MMOL/L
TRIGL SERPL-MCNC: 146 MG/DL
WBC # FLD AUTO: 6.24 K/UL

## 2023-08-11 ENCOUNTER — RESULT REVIEW (OUTPATIENT)
Age: 48
End: 2023-08-11

## 2023-08-11 ENCOUNTER — APPOINTMENT (OUTPATIENT)
Dept: ULTRASOUND IMAGING | Facility: IMAGING CENTER | Age: 48
End: 2023-08-11
Payer: COMMERCIAL

## 2023-08-11 ENCOUNTER — OUTPATIENT (OUTPATIENT)
Dept: OUTPATIENT SERVICES | Facility: HOSPITAL | Age: 48
LOS: 1 days | End: 2023-08-11
Payer: COMMERCIAL

## 2023-08-11 ENCOUNTER — APPOINTMENT (OUTPATIENT)
Dept: MAMMOGRAPHY | Facility: IMAGING CENTER | Age: 48
End: 2023-08-11
Payer: COMMERCIAL

## 2023-08-11 DIAGNOSIS — Z00.00 ENCOUNTER FOR GENERAL ADULT MEDICAL EXAMINATION WITHOUT ABNORMAL FINDINGS: ICD-10-CM

## 2023-08-11 DIAGNOSIS — Z00.8 ENCOUNTER FOR OTHER GENERAL EXAMINATION: ICD-10-CM

## 2023-08-11 PROCEDURE — 76641 ULTRASOUND BREAST COMPLETE: CPT | Mod: 26,50

## 2023-08-11 PROCEDURE — 77063 BREAST TOMOSYNTHESIS BI: CPT | Mod: 26

## 2023-08-11 PROCEDURE — 77067 SCR MAMMO BI INCL CAD: CPT | Mod: 26

## 2023-08-11 PROCEDURE — 77063 BREAST TOMOSYNTHESIS BI: CPT

## 2023-08-11 PROCEDURE — 76641 ULTRASOUND BREAST COMPLETE: CPT

## 2023-08-11 PROCEDURE — 77067 SCR MAMMO BI INCL CAD: CPT

## 2023-08-16 ENCOUNTER — APPOINTMENT (OUTPATIENT)
Dept: OBGYN | Facility: CLINIC | Age: 48
End: 2023-08-16
Payer: COMMERCIAL

## 2023-08-16 VITALS
DIASTOLIC BLOOD PRESSURE: 86 MMHG | SYSTOLIC BLOOD PRESSURE: 134 MMHG | HEIGHT: 62 IN | WEIGHT: 141.38 LBS | BODY MASS INDEX: 26.02 KG/M2

## 2023-08-16 DIAGNOSIS — N95.8 OTHER SPECIFIED MENOPAUSAL AND PERIMENOPAUSAL DISORDERS: ICD-10-CM

## 2023-08-16 DIAGNOSIS — N94.10 UNSPECIFIED DYSPAREUNIA: ICD-10-CM

## 2023-08-16 PROCEDURE — 99396 PREV VISIT EST AGE 40-64: CPT

## 2023-08-16 NOTE — PHYSICAL EXAM
[Chaperone Declined] : Patient declined chaperone [Appropriately responsive] : appropriately responsive [Alert] : alert [No Acute Distress] : no acute distress [No Lymphadenopathy] : no lymphadenopathy [Soft] : soft [Non-tender] : non-tender [Non-distended] : non-distended [No HSM] : No HSM [No Lesions] : no lesions [No Mass] : no mass [Oriented x3] : oriented x3 [Examination Of The Breasts] : a normal appearance [No Masses] : no breast masses were palpable [Vulvar Atrophy] : vulvar atrophy [Labia Majora] : normal [Labia Minora] : normal [Normal] : normal [Atrophy] : atrophy [Absent] : absent [Uterine Adnexae] : absent

## 2023-09-20 ENCOUNTER — APPOINTMENT (OUTPATIENT)
Dept: SURGICAL ONCOLOGY | Facility: CLINIC | Age: 48
End: 2023-09-20
Payer: COMMERCIAL

## 2023-09-20 VITALS
BODY MASS INDEX: 26.31 KG/M2 | DIASTOLIC BLOOD PRESSURE: 79 MMHG | WEIGHT: 143 LBS | OXYGEN SATURATION: 99 % | HEIGHT: 62 IN | SYSTOLIC BLOOD PRESSURE: 120 MMHG | HEART RATE: 65 BPM | RESPIRATION RATE: 16 BRPM

## 2023-09-20 DIAGNOSIS — N60.19 DIFFUSE CYSTIC MASTOPATHY OF UNSPECIFIED BREAST: ICD-10-CM

## 2023-09-20 PROCEDURE — 99244 OFF/OP CNSLTJ NEW/EST MOD 40: CPT

## 2023-10-04 ENCOUNTER — APPOINTMENT (OUTPATIENT)
Dept: COLORECTAL SURGERY | Facility: CLINIC | Age: 48
End: 2023-10-04
Payer: COMMERCIAL

## 2023-10-04 VITALS
HEART RATE: 84 BPM | WEIGHT: 146 LBS | HEIGHT: 62 IN | RESPIRATION RATE: 15 BRPM | OXYGEN SATURATION: 98 % | TEMPERATURE: 98.3 F | SYSTOLIC BLOOD PRESSURE: 143 MMHG | BODY MASS INDEX: 26.87 KG/M2 | DIASTOLIC BLOOD PRESSURE: 87 MMHG

## 2023-10-04 DIAGNOSIS — Z78.9 OTHER SPECIFIED HEALTH STATUS: ICD-10-CM

## 2023-10-04 PROCEDURE — 99203 OFFICE O/P NEW LOW 30 MIN: CPT

## 2023-10-04 RX ORDER — ALBUTEROL SULFATE 90 UG/1
108 (90 BASE) INHALANT RESPIRATORY (INHALATION)
Qty: 1 | Refills: 3 | Status: DISCONTINUED | COMMUNITY
Start: 2021-09-14 | End: 2023-10-04

## 2023-10-04 RX ORDER — SIMETHICONE 125 MG
125 CAPSULE ORAL 4 TIMES DAILY
Qty: 120 | Refills: 2 | Status: DISCONTINUED | COMMUNITY
Start: 2021-03-04 | End: 2023-10-04

## 2023-10-04 RX ORDER — ESTRADIOL 0.1 MG/G
0.1 CREAM VAGINAL
Qty: 1 | Refills: 3 | Status: DISCONTINUED | COMMUNITY
Start: 2023-08-16 | End: 2023-10-04

## 2023-10-04 RX ORDER — CETIRIZINE HYDROCHLORIDE 10 MG/1
TABLET, FILM COATED ORAL
Refills: 0 | Status: ACTIVE | COMMUNITY

## 2023-10-04 RX ORDER — CLOTRIMAZOLE AND BETAMETHASONE DIPROPIONATE 10; .5 MG/G; MG/G
1-0.05 CREAM TOPICAL
Qty: 1 | Refills: 0 | Status: DISCONTINUED | COMMUNITY
Start: 2020-09-17 | End: 2023-10-04

## 2023-10-04 RX ORDER — ESTRADIOL 0.1 MG/G
0.1 CREAM VAGINAL
Qty: 1 | Refills: 0 | Status: DISCONTINUED | OUTPATIENT
Start: 2020-09-29 | End: 2023-10-04

## 2023-10-04 RX ORDER — HYDROCORTISONE 10 MG/G
1 CREAM TOPICAL TWICE DAILY
Qty: 10 | Refills: 0 | Status: DISCONTINUED | COMMUNITY
Start: 2020-10-22 | End: 2023-10-04

## 2023-10-04 RX ORDER — ATORVASTATIN CALCIUM 40 MG/1
40 TABLET, FILM COATED ORAL DAILY
Qty: 90 | Refills: 0 | Status: DISCONTINUED | COMMUNITY
Start: 2023-02-23 | End: 2023-10-04

## 2023-10-04 RX ORDER — AMOXICILLIN AND CLAVULANATE POTASSIUM 875; 125 MG/1; MG/1
875-125 TABLET, COATED ORAL TWICE DAILY
Qty: 20 | Refills: 0 | Status: DISCONTINUED | COMMUNITY
Start: 2023-02-23 | End: 2023-10-04

## 2023-10-04 RX ORDER — FLUTICASONE PROPIONATE 44 UG/1
44 AEROSOL, METERED RESPIRATORY (INHALATION)
Qty: 1 | Refills: 5 | Status: DISCONTINUED | COMMUNITY
Start: 2021-09-14 | End: 2023-10-04

## 2023-10-04 RX ORDER — PANTOPRAZOLE 40 MG/1
40 TABLET, DELAYED RELEASE ORAL DAILY
Qty: 90 | Refills: 1 | Status: DISCONTINUED | COMMUNITY
Start: 2017-07-31 | End: 2023-10-04

## 2023-10-04 RX ORDER — POLYETHYLENE GLYCOL 3350 17 G/17G
17 POWDER, FOR SOLUTION ORAL DAILY
Qty: 30 | Refills: 0 | Status: DISCONTINUED | COMMUNITY
Start: 2021-03-04 | End: 2023-10-04

## 2023-10-04 RX ORDER — L-DESOXYEPHEDRINE 50 MG
1.7 INHALER (EA) NASAL
Qty: 14 | Refills: 0 | Status: DISCONTINUED | COMMUNITY
Start: 2023-02-16 | End: 2023-10-04

## 2023-10-04 RX ORDER — ESTRADIOL 10 UG/1
10 TABLET VAGINAL
Qty: 24 | Refills: 3 | Status: DISCONTINUED | COMMUNITY
Start: 2023-08-16 | End: 2023-10-04

## 2023-10-04 RX ORDER — SODIUM CHLORIDE 0.65 %
0.65 AEROSOL, SPRAY (ML) NASAL
Qty: 1 | Refills: 2 | Status: DISCONTINUED | COMMUNITY
Start: 2019-03-29 | End: 2023-10-04

## 2023-10-04 RX ORDER — GUAIFENESIN 600 MG/1
600 TABLET, EXTENDED RELEASE ORAL
Qty: 28 | Refills: 0 | Status: DISCONTINUED | COMMUNITY
Start: 2023-02-16 | End: 2023-10-04

## 2023-10-04 RX ORDER — CETIRIZINE HYDROCHLORIDE 10 MG/1
10 TABLET, COATED ORAL
Qty: 1 | Refills: 5 | Status: DISCONTINUED | COMMUNITY
Start: 2021-09-14 | End: 2023-10-04

## 2023-10-30 ENCOUNTER — APPOINTMENT (OUTPATIENT)
Dept: COLORECTAL SURGERY | Facility: CLINIC | Age: 48
End: 2023-10-30
Payer: COMMERCIAL

## 2023-10-30 PROCEDURE — 45378 DIAGNOSTIC COLONOSCOPY: CPT

## 2023-12-16 ENCOUNTER — EMERGENCY (EMERGENCY)
Facility: HOSPITAL | Age: 48
LOS: 1 days | Discharge: HOME | End: 2023-12-16
Attending: EMERGENCY MEDICINE | Admitting: INTERNAL MEDICINE
Payer: COMMERCIAL

## 2023-12-16 VITALS
TEMPERATURE: 98 F | OXYGEN SATURATION: 98 % | DIASTOLIC BLOOD PRESSURE: 83 MMHG | WEIGHT: 145.06 LBS | HEIGHT: 61 IN | HEART RATE: 113 BPM | SYSTOLIC BLOOD PRESSURE: 125 MMHG | RESPIRATION RATE: 20 BRPM

## 2023-12-16 LAB
ALBUMIN SERPL ELPH-MCNC: 3.5 G/DL — SIGNIFICANT CHANGE UP (ref 3.3–5)
ALBUMIN SERPL ELPH-MCNC: 3.5 G/DL — SIGNIFICANT CHANGE UP (ref 3.3–5)
ALP SERPL-CCNC: 104 U/L — SIGNIFICANT CHANGE UP (ref 40–120)
ALP SERPL-CCNC: 104 U/L — SIGNIFICANT CHANGE UP (ref 40–120)
ALT FLD-CCNC: 28 U/L — SIGNIFICANT CHANGE UP (ref 10–45)
ALT FLD-CCNC: 28 U/L — SIGNIFICANT CHANGE UP (ref 10–45)
ANION GAP SERPL CALC-SCNC: 9 MMOL/L — SIGNIFICANT CHANGE UP (ref 5–17)
ANION GAP SERPL CALC-SCNC: 9 MMOL/L — SIGNIFICANT CHANGE UP (ref 5–17)
AST SERPL-CCNC: 19 U/L — SIGNIFICANT CHANGE UP (ref 10–40)
AST SERPL-CCNC: 19 U/L — SIGNIFICANT CHANGE UP (ref 10–40)
BASOPHILS # BLD AUTO: 0.03 K/UL — SIGNIFICANT CHANGE UP (ref 0–0.2)
BASOPHILS # BLD AUTO: 0.03 K/UL — SIGNIFICANT CHANGE UP (ref 0–0.2)
BASOPHILS NFR BLD AUTO: 0.5 % — SIGNIFICANT CHANGE UP (ref 0–2)
BASOPHILS NFR BLD AUTO: 0.5 % — SIGNIFICANT CHANGE UP (ref 0–2)
BILIRUB SERPL-MCNC: 0.4 MG/DL — SIGNIFICANT CHANGE UP (ref 0.2–1.2)
BILIRUB SERPL-MCNC: 0.4 MG/DL — SIGNIFICANT CHANGE UP (ref 0.2–1.2)
BUN SERPL-MCNC: 7 MG/DL — SIGNIFICANT CHANGE UP (ref 7–23)
BUN SERPL-MCNC: 7 MG/DL — SIGNIFICANT CHANGE UP (ref 7–23)
CALCIUM SERPL-MCNC: 9 MG/DL — SIGNIFICANT CHANGE UP (ref 8.4–10.5)
CALCIUM SERPL-MCNC: 9 MG/DL — SIGNIFICANT CHANGE UP (ref 8.4–10.5)
CHLORIDE SERPL-SCNC: 105 MMOL/L — SIGNIFICANT CHANGE UP (ref 96–108)
CHLORIDE SERPL-SCNC: 105 MMOL/L — SIGNIFICANT CHANGE UP (ref 96–108)
CO2 SERPL-SCNC: 25 MMOL/L — SIGNIFICANT CHANGE UP (ref 22–31)
CO2 SERPL-SCNC: 25 MMOL/L — SIGNIFICANT CHANGE UP (ref 22–31)
CREAT SERPL-MCNC: 0.67 MG/DL — SIGNIFICANT CHANGE UP (ref 0.5–1.3)
CREAT SERPL-MCNC: 0.67 MG/DL — SIGNIFICANT CHANGE UP (ref 0.5–1.3)
D DIMER BLD IA.RAPID-MCNC: <150 NG/ML DDU — SIGNIFICANT CHANGE UP
D DIMER BLD IA.RAPID-MCNC: <150 NG/ML DDU — SIGNIFICANT CHANGE UP
EGFR: 106 ML/MIN/1.73M2 — SIGNIFICANT CHANGE UP
EGFR: 106 ML/MIN/1.73M2 — SIGNIFICANT CHANGE UP
EOSINOPHIL # BLD AUTO: 0.04 K/UL — SIGNIFICANT CHANGE UP (ref 0–0.5)
EOSINOPHIL # BLD AUTO: 0.04 K/UL — SIGNIFICANT CHANGE UP (ref 0–0.5)
EOSINOPHIL NFR BLD AUTO: 0.6 % — SIGNIFICANT CHANGE UP (ref 0–6)
EOSINOPHIL NFR BLD AUTO: 0.6 % — SIGNIFICANT CHANGE UP (ref 0–6)
GLUCOSE SERPL-MCNC: 112 MG/DL — HIGH (ref 70–99)
GLUCOSE SERPL-MCNC: 112 MG/DL — HIGH (ref 70–99)
HCT VFR BLD CALC: 40.3 % — SIGNIFICANT CHANGE UP (ref 34.5–45)
HCT VFR BLD CALC: 40.3 % — SIGNIFICANT CHANGE UP (ref 34.5–45)
HGB BLD-MCNC: 14 G/DL — SIGNIFICANT CHANGE UP (ref 11.5–15.5)
HGB BLD-MCNC: 14 G/DL — SIGNIFICANT CHANGE UP (ref 11.5–15.5)
IMM GRANULOCYTES NFR BLD AUTO: 0.3 % — SIGNIFICANT CHANGE UP (ref 0–0.9)
IMM GRANULOCYTES NFR BLD AUTO: 0.3 % — SIGNIFICANT CHANGE UP (ref 0–0.9)
LYMPHOCYTES # BLD AUTO: 1.6 K/UL — SIGNIFICANT CHANGE UP (ref 1–3.3)
LYMPHOCYTES # BLD AUTO: 1.6 K/UL — SIGNIFICANT CHANGE UP (ref 1–3.3)
LYMPHOCYTES # BLD AUTO: 24.5 % — SIGNIFICANT CHANGE UP (ref 13–44)
LYMPHOCYTES # BLD AUTO: 24.5 % — SIGNIFICANT CHANGE UP (ref 13–44)
MCHC RBC-ENTMCNC: 31.4 PG — SIGNIFICANT CHANGE UP (ref 27–34)
MCHC RBC-ENTMCNC: 31.4 PG — SIGNIFICANT CHANGE UP (ref 27–34)
MCHC RBC-ENTMCNC: 34.7 GM/DL — SIGNIFICANT CHANGE UP (ref 32–36)
MCHC RBC-ENTMCNC: 34.7 GM/DL — SIGNIFICANT CHANGE UP (ref 32–36)
MCV RBC AUTO: 90.4 FL — SIGNIFICANT CHANGE UP (ref 80–100)
MCV RBC AUTO: 90.4 FL — SIGNIFICANT CHANGE UP (ref 80–100)
MONOCYTES # BLD AUTO: 0.44 K/UL — SIGNIFICANT CHANGE UP (ref 0–0.9)
MONOCYTES # BLD AUTO: 0.44 K/UL — SIGNIFICANT CHANGE UP (ref 0–0.9)
MONOCYTES NFR BLD AUTO: 6.7 % — SIGNIFICANT CHANGE UP (ref 2–14)
MONOCYTES NFR BLD AUTO: 6.7 % — SIGNIFICANT CHANGE UP (ref 2–14)
NEUTROPHILS # BLD AUTO: 4.41 K/UL — SIGNIFICANT CHANGE UP (ref 1.8–7.4)
NEUTROPHILS # BLD AUTO: 4.41 K/UL — SIGNIFICANT CHANGE UP (ref 1.8–7.4)
NEUTROPHILS NFR BLD AUTO: 67.4 % — SIGNIFICANT CHANGE UP (ref 43–77)
NEUTROPHILS NFR BLD AUTO: 67.4 % — SIGNIFICANT CHANGE UP (ref 43–77)
NRBC # BLD: 0 /100 WBCS — SIGNIFICANT CHANGE UP (ref 0–0)
NRBC # BLD: 0 /100 WBCS — SIGNIFICANT CHANGE UP (ref 0–0)
PLATELET # BLD AUTO: 342 K/UL — SIGNIFICANT CHANGE UP (ref 150–400)
PLATELET # BLD AUTO: 342 K/UL — SIGNIFICANT CHANGE UP (ref 150–400)
POTASSIUM SERPL-MCNC: 3.7 MMOL/L — SIGNIFICANT CHANGE UP (ref 3.5–5.3)
POTASSIUM SERPL-MCNC: 3.7 MMOL/L — SIGNIFICANT CHANGE UP (ref 3.5–5.3)
POTASSIUM SERPL-SCNC: 3.7 MMOL/L — SIGNIFICANT CHANGE UP (ref 3.5–5.3)
POTASSIUM SERPL-SCNC: 3.7 MMOL/L — SIGNIFICANT CHANGE UP (ref 3.5–5.3)
PROT SERPL-MCNC: 7.3 G/DL — SIGNIFICANT CHANGE UP (ref 6–8.3)
PROT SERPL-MCNC: 7.3 G/DL — SIGNIFICANT CHANGE UP (ref 6–8.3)
RBC # BLD: 4.46 M/UL — SIGNIFICANT CHANGE UP (ref 3.8–5.2)
RBC # BLD: 4.46 M/UL — SIGNIFICANT CHANGE UP (ref 3.8–5.2)
RBC # FLD: 12.2 % — SIGNIFICANT CHANGE UP (ref 10.3–14.5)
RBC # FLD: 12.2 % — SIGNIFICANT CHANGE UP (ref 10.3–14.5)
SODIUM SERPL-SCNC: 139 MMOL/L — SIGNIFICANT CHANGE UP (ref 135–145)
SODIUM SERPL-SCNC: 139 MMOL/L — SIGNIFICANT CHANGE UP (ref 135–145)
TROPONIN I, HIGH SENSITIVITY RESULT: 4.4 NG/L — SIGNIFICANT CHANGE UP
TROPONIN I, HIGH SENSITIVITY RESULT: 4.4 NG/L — SIGNIFICANT CHANGE UP
WBC # BLD: 6.54 K/UL — SIGNIFICANT CHANGE UP (ref 3.8–10.5)
WBC # BLD: 6.54 K/UL — SIGNIFICANT CHANGE UP (ref 3.8–10.5)
WBC # FLD AUTO: 6.54 K/UL — SIGNIFICANT CHANGE UP (ref 3.8–10.5)
WBC # FLD AUTO: 6.54 K/UL — SIGNIFICANT CHANGE UP (ref 3.8–10.5)

## 2023-12-16 PROCEDURE — 99285 EMERGENCY DEPT VISIT HI MDM: CPT

## 2023-12-16 PROCEDURE — 36415 COLL VENOUS BLD VENIPUNCTURE: CPT

## 2023-12-16 PROCEDURE — 71045 X-RAY EXAM CHEST 1 VIEW: CPT | Mod: 26

## 2023-12-16 PROCEDURE — 93005 ELECTROCARDIOGRAM TRACING: CPT

## 2023-12-16 PROCEDURE — 71045 X-RAY EXAM CHEST 1 VIEW: CPT

## 2023-12-16 PROCEDURE — 99285 EMERGENCY DEPT VISIT HI MDM: CPT | Mod: 25

## 2023-12-16 PROCEDURE — 84484 ASSAY OF TROPONIN QUANT: CPT

## 2023-12-16 PROCEDURE — 80053 COMPREHEN METABOLIC PANEL: CPT

## 2023-12-16 PROCEDURE — 93010 ELECTROCARDIOGRAM REPORT: CPT

## 2023-12-16 PROCEDURE — 85025 COMPLETE CBC W/AUTO DIFF WBC: CPT

## 2023-12-16 PROCEDURE — 85379 FIBRIN DEGRADATION QUANT: CPT

## 2023-12-16 RX ADMIN — Medication 0.5 MILLIGRAM(S): at 20:13

## 2023-12-16 NOTE — ED PROVIDER NOTE - NSFOLLOWUPINSTRUCTIONS_ED_ALL_ED_FT
Anxiety    Generalized anxiety disorder (CLAUDY) is a mental disorder. It is defined as anxiety that is not necessarily related to specific events or activities or is out of proportion to said events. Symptoms include restlessness, fatigue, difficulty concentrations, irritability and difficulty concentrating. It may interfere with life functions, including relationships, work, and school. If you were started on a medication, make sure to take exactly as prescribed and follow up with a psychiatrist.    SEEK IMMEDIATE MEDICAL CARE IF YOU HAVE ANY OF THE FOLLOWING SYMPTOMS: thoughts about hurting killing yourself, thoughts about hurting or killing somebody else, hallucinations, or worsening depression.

## 2023-12-16 NOTE — ED PROVIDER NOTE - PATIENT PORTAL LINK FT
You can access the FollowMyHealth Patient Portal offered by [facility] by registering at the following website: [email or web address]. By joining [employer]’s FollowMyHealth portal, you will also be able to view your health information using other applications (apps) compatible with our system. You can access the FollowMyHealth Patient Portal offered by [facility] by registering at the following website: [email or web address]. By joining [employer]’s FollowMyHealth portal, you will also be able to view your health information using other applications (apps) compatible with our system.

## 2023-12-16 NOTE — ED ADULT NURSE NOTE - OBJECTIVE STATEMENT
Assumed pt care for a 48 yr old female complaining of abdominal pain since yesterday. Pt denies any recent contact with anyone who is sick. Awaiting further orders from MD.

## 2023-12-16 NOTE — ED ADULT NURSE NOTE - NSFALLUNIVINTERV_ED_ALL_ED
Bed/Stretcher in lowest position, wheels locked, appropriate side rails in place/Call bell, personal items and telephone in reach/Instruct patient to call for assistance before getting out of bed/chair/stretcher/Non-slip footwear applied when patient is off stretcher/[place] to call system/Physically safe environment - no spills, clutter or unnecessary equipment/Purposeful proactive rounding/Room/bathroom lighting operational, light cord in reach Bed/Stretcher in lowest position, wheels locked, appropriate side rails in place/Call bell, personal items and telephone in reach/Instruct patient to call for assistance before getting out of bed/chair/stretcher/Non-slip footwear applied when patient is off stretcher/[place] to call system/Physically safe environment - no spills, clutter or unnecessary equipment/Purposeful proactive rounding/Room/bathroom lighting operational, light cord in reach

## 2023-12-16 NOTE — ED PROVIDER NOTE - OBJECTIVE STATEMENT
Patient with pmh anxiety, complains of 2 day hx palpitations in left lower chest and left upper abdomen, feels pulsating sensation. No sob, no nausea or emesis. No back or neck pain. Patient has been under a lot of stress and feels very nervous.

## 2023-12-16 NOTE — ED PROVIDER NOTE - NS ED ROS FT
Except as otherwise indicated in HPI:  CONSTITUTIONAL: Neg  HEENT: neg  CV: +palp, no cp  Resp: neg  GI: Neg  : Neg  MSK: Neg  SKIN: Neg  NEURO: Neg  PSYCHIATRIC: Neg  Heme/Onc: Neg

## 2023-12-16 NOTE — ED PROVIDER NOTE - PHYSICAL EXAMINATION
Gen:  alert, awake, no acute distress  Head:  atraumatic, normocephalic  HEENT: PERRLA, EOMI, normal nose, normal oropharynx, no tonsillar edema, erythema, or exudate  CV:  regular tachycardic, nl S1, S2, no m/r/g,  no chest wall ttp  Pulm:  lungs CTA b/l  Abd: soft non distended, minimal ttp luq, no rebound no guarding, no hsm  MSK:  moving all extremities, no back midline ttp, no stepoffs, no cva TTP  Neuro:  grossly intact, no focal deficits  Skin:  clear, dry, intact  Psych: AOx3, normal affect, no apparent risk to self or others

## 2023-12-16 NOTE — ED PROVIDER NOTE - CLINICAL SUMMARY MEDICAL DECISION MAKING FREE TEXT BOX
Patient with pmh anxiety, complains of 2 day hx palpitations in left lower chest and left upper abdomen, feels pulsating sensation. No sob, no nausea or emesis. No back or neck pain. Patient has been under a lot of stress and feels very nervous.    cbc,cmp, ekg, d-dimer, cxr, ativan, iv fluids, re-assess    sign out dr garcia: follow up labs and re-assess pt clinical status

## 2023-12-16 NOTE — ED ADULT NURSE NOTE - NSFALLRISKASMTTYPE_ED_ALL_ED
Cipro is not a problem with his RA medications. He can go ahead and take it. Please let him know. Thanks.
Please see message below and advise.
Pt called in stating that he has a UTI and was prescribed the antibiotic Cipro by his PCP. Pt states that Cipro interacts with his RA medications and is seeking advice as he has not started the antibiotic.   Pt is requesting a call back as soon as possible
Spoke with and he is aware of provider's message below. Patient verbalized understanding and agreeable with plan. No further questions at this time.
Initial (On Arrival)

## 2023-12-17 VITALS
SYSTOLIC BLOOD PRESSURE: 120 MMHG | DIASTOLIC BLOOD PRESSURE: 73 MMHG | RESPIRATION RATE: 18 BRPM | OXYGEN SATURATION: 98 % | HEART RATE: 90 BPM

## 2023-12-22 ENCOUNTER — APPOINTMENT (OUTPATIENT)
Dept: INTERNAL MEDICINE | Facility: CLINIC | Age: 48
End: 2023-12-22
Payer: COMMERCIAL

## 2023-12-22 VITALS
HEART RATE: 96 BPM | SYSTOLIC BLOOD PRESSURE: 120 MMHG | BODY MASS INDEX: 26.5 KG/M2 | OXYGEN SATURATION: 98 % | DIASTOLIC BLOOD PRESSURE: 80 MMHG | HEIGHT: 62 IN | WEIGHT: 144 LBS

## 2023-12-22 PROCEDURE — 99214 OFFICE O/P EST MOD 30 MIN: CPT

## 2023-12-22 RX ORDER — LORAZEPAM 0.5 MG/1
0.5 TABLET ORAL
Qty: 10 | Refills: 0 | Status: ACTIVE | COMMUNITY
Start: 2023-12-22 | End: 1900-01-01

## 2023-12-22 NOTE — PHYSICAL EXAM
[No Acute Distress] : no acute distress [Well Developed] : well developed [Normal Sclera/Conjunctiva] : normal sclera/conjunctiva [No Respiratory Distress] : no respiratory distress  [No Accessory Muscle Use] : no accessory muscle use [Clear to Auscultation] : lungs were clear to auscultation bilaterally [Soft] : abdomen soft [Non-distended] : non-distended [de-identified] : Mildly tachycardic [de-identified] : Epigastric discomfort to deep palpation [de-identified] : Answering questions appropriately. Gets on exam table and ambulates without assistance [de-identified] : Intermittently tearful

## 2023-12-22 NOTE — ASSESSMENT
[FreeTextEntry1] : HCM: - Lipids, vitamin D, TSH, vitamin B12, H, pylori - Colonoscopy 2023, next due 2028 - Mammo/sono 2023, next due 2024 - DEXA 2020, due but will discuss at next visit  RTC 1 month for follow-up

## 2023-12-22 NOTE — HISTORY OF PRESENT ILLNESS
[de-identified] : Allie Cerda is a 47yo F PMHx of Ovarian ca s/p CALVIN/BSO, osteopenia, anxiety, HLD, preDM who presents to establish care.  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 Developed back pain 3 weeks ago, which she thought it was just sciatica from extra stress. 2 weeks ago, developed abdominal upset and reflux, also developed dizziness associated with a pulling sensation in her abdomen to her neck. About 1 week ago developed palpitations and LUQ abdominal pain. She tried to take her PPI for reflux without significant relief. She drank some coffee and developed terrible palpitations alarmed her and so she went to the ER. ECG showed sinus tach with HR of 105, trop and D-dimer neg.  In hindsight, she suspect this was related to bad anxiety, currently she is still getting very scared with loud noises. Reports she will hear something that startles her, which will set off the palpitations, which then make her more anxious. She is concerned because she hopes to travel to her home country in 1 week and return in Jan, she is not sure how she will cope.  Still having persistent LUQ pain and GERD  Was told by her chiropractor that she has a calcified thyroid nodule? Was told by a chiropractor that she has a calcification in her thyroid? Apparently he took Xrays and saw this? Was told by a chiropractor that she has a calcification in her thyroid? Apparently he took Xrays and saw this?

## 2023-12-26 ENCOUNTER — APPOINTMENT (OUTPATIENT)
Dept: ULTRASOUND IMAGING | Facility: IMAGING CENTER | Age: 48
End: 2023-12-26
Payer: COMMERCIAL

## 2023-12-26 ENCOUNTER — OUTPATIENT (OUTPATIENT)
Dept: OUTPATIENT SERVICES | Facility: HOSPITAL | Age: 48
LOS: 1 days | End: 2023-12-26
Payer: COMMERCIAL

## 2023-12-26 DIAGNOSIS — E04.1 NONTOXIC SINGLE THYROID NODULE: ICD-10-CM

## 2023-12-26 PROCEDURE — 76536 US EXAM OF HEAD AND NECK: CPT | Mod: 26

## 2023-12-26 PROCEDURE — 76536 US EXAM OF HEAD AND NECK: CPT

## 2024-01-18 ENCOUNTER — APPOINTMENT (OUTPATIENT)
Dept: OTOLARYNGOLOGY | Facility: CLINIC | Age: 49
End: 2024-01-18
Payer: COMMERCIAL

## 2024-01-18 VITALS — SYSTOLIC BLOOD PRESSURE: 121 MMHG | TEMPERATURE: 97.9 F | HEART RATE: 86 BPM | DIASTOLIC BLOOD PRESSURE: 66 MMHG

## 2024-01-18 VITALS — HEIGHT: 62 IN | BODY MASS INDEX: 26.5 KG/M2 | WEIGHT: 144 LBS

## 2024-01-18 DIAGNOSIS — J32.9 CHRONIC SINUSITIS, UNSPECIFIED: ICD-10-CM

## 2024-01-18 PROCEDURE — 99204 OFFICE O/P NEW MOD 45 MIN: CPT | Mod: 25

## 2024-01-18 PROCEDURE — 31231 NASAL ENDOSCOPY DX: CPT

## 2024-01-18 NOTE — REVIEW OF SYSTEMS
[As Noted in HPI] : as noted in HPI [Nasal Congestion] : nasal congestion [Nose Bleeds] : nose bleeds [Recurrent Sinus Infections] : recurrent sinus infections [Sinus Pressure] : sinus pressure [Negative] : Heme/Lymph

## 2024-01-18 NOTE — HISTORY OF PRESENT ILLNESS
[de-identified] : Patient comes in with recurrent sinus issues int he past but the last year has been significantly worse. She has been given antibiotics in the past multiple times. She has been given nasal saline sprays and other sprays but she continues to get a very dry nose and occasional bleeding. When she has an acute sinusitis she gets left facial pressure and cheek swelling and her vision feels affected. When the sinuses are at its worst the pressure radiates to the renetta and ears . The last acute sinusitis treated with an antibiotic was a few months ago but she has suffered since. Her doctor didnt want rto give her any more antibitoics

## 2024-01-18 NOTE — END OF VISIT
[FreeTextEntry3] : I, Dr. Austin personally performed the evaluation and management (E/M) services including all necessary procedures, for this new patient. That E/M includes conducting the clinically appropriate initial history &/or exam, assessing all conditions, and establishing the plan of care. Today, my SPENCER, Jessica Pascual, was here to observe &/or participate in the visit & follow plan of care established by me.

## 2024-01-18 NOTE — ASSESSMENT
[FreeTextEntry1] : Patient long history of sinus infections been on multiple courses of antibiotics the last one several months ago comes in for evaluation once again feeling pain and pressure in her cheeks endoscopically no pus pus or purulence but some tenderness consistent with underlying sinus issue put her on a Z-Clemente and sent her for a CAT scan to definitively evaluate her sinuses determine if any additional interventions indicated.

## 2024-01-22 ENCOUNTER — APPOINTMENT (OUTPATIENT)
Dept: INTERNAL MEDICINE | Facility: CLINIC | Age: 49
End: 2024-01-22
Payer: COMMERCIAL

## 2024-01-22 ENCOUNTER — OUTPATIENT (OUTPATIENT)
Dept: OUTPATIENT SERVICES | Facility: HOSPITAL | Age: 49
LOS: 1 days | End: 2024-01-22
Payer: COMMERCIAL

## 2024-01-22 VITALS
HEART RATE: 76 BPM | HEIGHT: 62 IN | WEIGHT: 149 LBS | DIASTOLIC BLOOD PRESSURE: 80 MMHG | SYSTOLIC BLOOD PRESSURE: 126 MMHG | OXYGEN SATURATION: 98 % | BODY MASS INDEX: 27.42 KG/M2

## 2024-01-22 DIAGNOSIS — Z87.09 PERSONAL HISTORY OF OTHER DISEASES OF THE RESPIRATORY SYSTEM: ICD-10-CM

## 2024-01-22 DIAGNOSIS — F41.9 ANXIETY DISORDER, UNSPECIFIED: ICD-10-CM

## 2024-01-22 DIAGNOSIS — Z87.898 PERSONAL HISTORY OF OTHER SPECIFIED CONDITIONS: ICD-10-CM

## 2024-01-22 DIAGNOSIS — J34.89 OTHER SPECIFIED DISORDERS OF NOSE AND NASAL SINUSES: ICD-10-CM

## 2024-01-22 DIAGNOSIS — R21 RASH AND OTHER NONSPECIFIC SKIN ERUPTION: ICD-10-CM

## 2024-01-22 DIAGNOSIS — N95.2 POSTMENOPAUSAL ATROPHIC VAGINITIS: ICD-10-CM

## 2024-01-22 DIAGNOSIS — Z87.01 PERSONAL HISTORY OF PNEUMONIA (RECURRENT): ICD-10-CM

## 2024-01-22 DIAGNOSIS — Z23 ENCOUNTER FOR IMMUNIZATION: ICD-10-CM

## 2024-01-22 LAB
25(OH)D3 SERPL-MCNC: 30.4 NG/ML
CHOLEST SERPL-MCNC: 261 MG/DL
ESTIMATED AVERAGE GLUCOSE: 108 MG/DL
H PYLORI AG STL QL: NEGATIVE
HBA1C MFR BLD HPLC: 5.4 %
HDLC SERPL-MCNC: 51 MG/DL
LDLC SERPL CALC-MCNC: 176 MG/DL
NONHDLC SERPL-MCNC: 209 MG/DL
TRIGL SERPL-MCNC: 182 MG/DL
TSH SERPL-ACNC: 1.15 UIU/ML
VIT B12 SERPL-MCNC: 565 PG/ML

## 2024-01-22 PROCEDURE — G2211 COMPLEX E/M VISIT ADD ON: CPT

## 2024-01-22 PROCEDURE — 99214 OFFICE O/P EST MOD 30 MIN: CPT

## 2024-01-22 PROCEDURE — G0463: CPT

## 2024-01-22 NOTE — HISTORY OF PRESENT ILLNESS
[de-identified] : Allie Cerda is a 49yo F PMHx of Ovarian ca s/p CALVIN/BSO, osteopenia, anxiety, HLD, preDM who presents to follow-up.  Saw ENT for her chronic sinusitis. Having pressure in her face. She notes her anxiety worsens when she is not feeling well, also was on vacation so currently she feels her anxiety is under control She didn't start sertraline because she took abx when starting for sinusitis and she felt nausea.  Discussed recent labs overall normal

## 2024-01-22 NOTE — PHYSICAL EXAM
[No Acute Distress] : no acute distress [Well Developed] : well developed [Normal Sclera/Conjunctiva] : normal sclera/conjunctiva [No Respiratory Distress] : no respiratory distress  [No Accessory Muscle Use] : no accessory muscle use [Clear to Auscultation] : lungs were clear to auscultation bilaterally [Normal Rate] : normal rate  [Regular Rhythm] : with a regular rhythm [Normal S1, S2] : normal S1 and S2 [Soft] : abdomen soft [Non Tender] : non-tender [Non-distended] : non-distended [Memory Grossly Normal] : memory grossly normal [de-identified] : Answering questions appropriately. Gets on exam table and ambulates without assistance

## 2024-01-22 NOTE — ASSESSMENT
[FreeTextEntry1] :  HCM: - Colonoscopy 2023, next due 2028 - Mammo/sono 2023, next due 2024 - DEXA 2020, ordered  RTC 1-2 months for med initiation

## 2024-01-23 DIAGNOSIS — I10 ESSENTIAL (PRIMARY) HYPERTENSION: ICD-10-CM

## 2024-01-25 ENCOUNTER — OUTPATIENT (OUTPATIENT)
Dept: OUTPATIENT SERVICES | Facility: HOSPITAL | Age: 49
LOS: 1 days | End: 2024-01-25
Payer: COMMERCIAL

## 2024-01-25 ENCOUNTER — APPOINTMENT (OUTPATIENT)
Dept: CT IMAGING | Facility: IMAGING CENTER | Age: 49
End: 2024-01-25
Payer: COMMERCIAL

## 2024-01-25 DIAGNOSIS — J32.9 CHRONIC SINUSITIS, UNSPECIFIED: ICD-10-CM

## 2024-01-25 PROCEDURE — 70486 CT MAXILLOFACIAL W/O DYE: CPT

## 2024-01-25 PROCEDURE — 70486 CT MAXILLOFACIAL W/O DYE: CPT | Mod: 26

## 2024-01-31 ENCOUNTER — NON-APPOINTMENT (OUTPATIENT)
Age: 49
End: 2024-01-31

## 2024-02-01 ENCOUNTER — NON-APPOINTMENT (OUTPATIENT)
Age: 49
End: 2024-02-01

## 2024-02-12 ENCOUNTER — APPOINTMENT (OUTPATIENT)
Dept: OTOLARYNGOLOGY | Facility: CLINIC | Age: 49
End: 2024-02-12
Payer: COMMERCIAL

## 2024-02-12 VITALS
BODY MASS INDEX: 27.42 KG/M2 | WEIGHT: 149 LBS | HEART RATE: 76 BPM | DIASTOLIC BLOOD PRESSURE: 64 MMHG | SYSTOLIC BLOOD PRESSURE: 108 MMHG | TEMPERATURE: 98 F | HEIGHT: 62 IN

## 2024-02-12 DIAGNOSIS — J34.2 DEVIATED NASAL SEPTUM: ICD-10-CM

## 2024-02-12 DIAGNOSIS — H04.209 UNSPECIFIED EPIPHORA, UNSPECIFIED SIDE: ICD-10-CM

## 2024-02-12 PROCEDURE — 99214 OFFICE O/P EST MOD 30 MIN: CPT | Mod: 25

## 2024-02-12 PROCEDURE — 31231 NASAL ENDOSCOPY DX: CPT

## 2024-02-12 NOTE — HISTORY OF PRESENT ILLNESS
[de-identified] : Patient comes in with recurrent sinus issues int he past but the last year has been significantly worse. She has been given antibiotics in the past multiple times. She has been given nasal saline sprays and other sprays but she continues to get a very dry nose and occasional bleeding. When she has an acute sinusitis she gets left facial pressure and cheek swelling and her vision feels affected. When the sinuses are at its worst the pressure radiates to the renetta and ears . The last acute sinusitis treated with an antibiotic was a few months ago but she has suffered since. Her doctor didnt want rto give her any more antibitoics  [FreeTextEntry1] : Patient continues to have burning in the head and eye tearing al the time. She has an apt with her eye doctor in March. She is not using any nasal sprays right now except saline. She did try taking benadryl for a week and then would get some dried mucus in the morning bilaterally. The Benadryl did help with the nasal pressure - and the zyrtec had stopped helping with this so she feels she needed to change to a new allergy pill

## 2024-02-12 NOTE — REVIEW OF SYSTEMS
[Nasal Congestion] : nasal congestion [Nose Bleeds] : nose bleeds [Sinus Pressure] : sinus pressure [Negative] : Heme/Lymph [As Noted in HPI] : as noted in HPI [Seasonal Allergies] : seasonal allergies

## 2024-03-04 ENCOUNTER — APPOINTMENT (OUTPATIENT)
Dept: OPHTHALMOLOGY | Facility: CLINIC | Age: 49
End: 2024-03-04
Payer: COMMERCIAL

## 2024-03-04 ENCOUNTER — NON-APPOINTMENT (OUTPATIENT)
Age: 49
End: 2024-03-04

## 2024-03-04 PROCEDURE — 92133 CPTRZD OPH DX IMG PST SGM ON: CPT

## 2024-03-04 PROCEDURE — 92014 COMPRE OPH EXAM EST PT 1/>: CPT

## 2024-03-18 ENCOUNTER — APPOINTMENT (OUTPATIENT)
Dept: INTERNAL MEDICINE | Facility: CLINIC | Age: 49
End: 2024-03-18

## 2024-04-15 ENCOUNTER — OUTPATIENT (OUTPATIENT)
Dept: OUTPATIENT SERVICES | Facility: HOSPITAL | Age: 49
LOS: 1 days | End: 2024-04-15
Payer: COMMERCIAL

## 2024-04-15 ENCOUNTER — APPOINTMENT (OUTPATIENT)
Dept: INTERNAL MEDICINE | Facility: CLINIC | Age: 49
End: 2024-04-15
Payer: COMMERCIAL

## 2024-04-15 VITALS
SYSTOLIC BLOOD PRESSURE: 132 MMHG | HEART RATE: 78 BPM | DIASTOLIC BLOOD PRESSURE: 80 MMHG | WEIGHT: 150 LBS | BODY MASS INDEX: 27.6 KG/M2 | OXYGEN SATURATION: 98 % | HEIGHT: 62 IN

## 2024-04-15 DIAGNOSIS — I10 ESSENTIAL (PRIMARY) HYPERTENSION: ICD-10-CM

## 2024-04-15 DIAGNOSIS — S61.211A LACERATION W/OUT FOREIGN BODY OF LEFT INDEX FINGER W/OUT DAMAGE TO NAIL, INITIAL ENCOUNTER: ICD-10-CM

## 2024-04-15 DIAGNOSIS — L03.012 CELLULITIS OF LEFT FINGER: ICD-10-CM

## 2024-04-15 PROCEDURE — G0463: CPT

## 2024-04-15 PROCEDURE — 99214 OFFICE O/P EST MOD 30 MIN: CPT

## 2024-04-15 RX ORDER — AZITHROMYCIN 250 MG/1
250 TABLET, FILM COATED ORAL
Qty: 1 | Refills: 0 | Status: COMPLETED | COMMUNITY
Start: 2024-01-18 | End: 2024-04-15

## 2024-04-15 NOTE — HISTORY OF PRESENT ILLNESS
[FreeTextEntry8] : 47 yo  anxiety osteopenia IFG  FH ovarian CA  Tdap allergy Cut apple with knife sliced L index finger April 11, 2024. States was a deep cut Did not go to hospital Swollen  Never back to normal  Still felt pain and sharp

## 2024-04-15 NOTE — ASSESSMENT
[FreeTextEntry1] : Rule out osteomyelitis Poor history risk of osteomyelitis given sharpness of knife and small amt of tissue L index Plastics PRN Xray r/o osteomyelitis Bactrim BID cover MRSA

## 2024-04-16 ENCOUNTER — OUTPATIENT (OUTPATIENT)
Dept: OUTPATIENT SERVICES | Facility: HOSPITAL | Age: 49
LOS: 1 days | End: 2024-04-16
Payer: COMMERCIAL

## 2024-04-16 ENCOUNTER — APPOINTMENT (OUTPATIENT)
Dept: RADIOLOGY | Facility: IMAGING CENTER | Age: 49
End: 2024-04-16
Payer: COMMERCIAL

## 2024-04-16 DIAGNOSIS — S61.211A LACERATION WITHOUT FOREIGN BODY OF LEFT INDEX FINGER WITHOUT DAMAGE TO NAIL, INITIAL ENCOUNTER: ICD-10-CM

## 2024-04-16 PROCEDURE — 73140 X-RAY EXAM OF FINGER(S): CPT

## 2024-04-16 PROCEDURE — 73140 X-RAY EXAM OF FINGER(S): CPT | Mod: 26,LT

## 2024-04-19 ENCOUNTER — NON-APPOINTMENT (OUTPATIENT)
Age: 49
End: 2024-04-19

## 2024-04-23 DIAGNOSIS — L03.012 CELLULITIS OF LEFT FINGER: ICD-10-CM

## 2024-04-23 DIAGNOSIS — S61.211A LACERATION WITHOUT FOREIGN BODY OF LEFT INDEX FINGER WITHOUT DAMAGE TO NAIL, INITIAL ENCOUNTER: ICD-10-CM

## 2024-04-30 ENCOUNTER — RX RENEWAL (OUTPATIENT)
Age: 49
End: 2024-04-30

## 2024-04-30 ENCOUNTER — APPOINTMENT (OUTPATIENT)
Dept: INTERNAL MEDICINE | Facility: CLINIC | Age: 49
End: 2024-04-30
Payer: COMMERCIAL

## 2024-04-30 ENCOUNTER — OUTPATIENT (OUTPATIENT)
Dept: OUTPATIENT SERVICES | Facility: HOSPITAL | Age: 49
LOS: 1 days | End: 2024-04-30
Payer: COMMERCIAL

## 2024-04-30 VITALS
DIASTOLIC BLOOD PRESSURE: 70 MMHG | WEIGHT: 146 LBS | SYSTOLIC BLOOD PRESSURE: 140 MMHG | HEIGHT: 62 IN | HEART RATE: 90 BPM | OXYGEN SATURATION: 97 % | BODY MASS INDEX: 26.87 KG/M2

## 2024-04-30 DIAGNOSIS — I10 ESSENTIAL (PRIMARY) HYPERTENSION: ICD-10-CM

## 2024-04-30 PROCEDURE — G0463: CPT

## 2024-04-30 PROCEDURE — 99214 OFFICE O/P EST MOD 30 MIN: CPT

## 2024-05-03 VITALS
WEIGHT: 145 LBS | BODY MASS INDEX: 26.68 KG/M2 | TEMPERATURE: 99.9 F | RESPIRATION RATE: 14 BRPM | OXYGEN SATURATION: 96 % | HEART RATE: 88 BPM | HEIGHT: 62 IN

## 2024-05-03 LAB
INFLUENZA A RESULT: NOT DETECTED
INFLUENZA B RESULT: NOT DETECTED
RESP SYN VIRUS RESULT: NOT DETECTED
SARS-COV-2 RESULT: NOT DETECTED

## 2024-05-03 NOTE — HISTORY OF PRESENT ILLNESS
[FreeTextEntry8] : Comes bc for 2-3 days having a sore throat mostly at first, now cough with mucus and wheeze.  Feels moments of tightness in her chest much like last viral URI she had some time back that required BDs.  Has a nebulizer at home from old dx of asthma, and from viral exacerbations - asking if she should use again.  At first thought she was having mostly allergies with nasal congestion and PND.  Then throat got sore, though may still be just PND of allergies, then had malaise and a lot more chest mucus and above syx.  Comfortable here as she gives hx.  No sinus/ear pain, no GI syx.

## 2024-05-03 NOTE — ASSESSMENT
[FreeTextEntry1] : 1) viral URI with bronchospasm; low grade fever here, day 3; in off chance this is influenza or covid, will swab to give us flexibility to rx w antiviral given her bronchospasm.  Will rx her nebulizer albuterol doses.  Also will try to get symbicort on her plan to add for as long as URI lasts; believe she is vulnerable to bronchospasm over these days.  If not covered, will switch to something similar.    34 minutes were spent in preparation of this visit, including review of previous notes and test results, interview and examination of the patient, communication with other care contributors, discussion of the plan, arranging for appropriate testing and treatment, and documentation.

## 2024-05-03 NOTE — REVIEW OF SYSTEMS
[Fever] : no fever [Chills] : no chills [Night Sweats] : no night sweats [FreeTextEntry4] : see hpi  [FreeTextEntry6] : see hpi

## 2024-05-03 NOTE — PHYSICAL EXAM
[No Acute Distress] : no acute distress [Well Nourished] : well nourished [Well Developed] : well developed [Well-Appearing] : well-appearing [Normal Sclera/Conjunctiva] : normal sclera/conjunctiva [PERRL] : pupils equal round and reactive to light [Normal Outer Ear/Nose] : the outer ears and nose were normal in appearance [Normal TMs] : both tympanic membranes were normal [No Lymphadenopathy] : no lymphadenopathy [Supple] : supple [No Respiratory Distress] : no respiratory distress  [No Accessory Muscle Use] : no accessory muscle use [Normal Percussion] : the chest was normal to percussion [Normal Rate] : normal rate  [Regular Rhythm] : with a regular rhythm [Normal S1, S2] : normal S1 and S2 [No Murmur] : no murmur heard [No Edema] : there was no peripheral edema [No Extremity Clubbing/Cyanosis] : no extremity clubbing/cyanosis [Normal Supraclavicular Nodes] : no supraclavicular lymphadenopathy [Normal Posterior Cervical Nodes] : no posterior cervical lymphadenopathy [Normal Anterior Cervical Nodes] : no anterior cervical lymphadenopathy [de-identified] : no conj d/c, injection [de-identified] : no pharyngeal exudate/erythema, no oral lesions [de-identified] : scattered minimal wheeze heard, no ronchi/rales noted

## 2024-05-07 DIAGNOSIS — J45.30 MILD PERSISTENT ASTHMA, UNCOMPLICATED: ICD-10-CM

## 2024-05-07 DIAGNOSIS — J06.9 ACUTE UPPER RESPIRATORY INFECTION, UNSPECIFIED: ICD-10-CM

## 2024-05-08 ENCOUNTER — OUTPATIENT (OUTPATIENT)
Dept: OUTPATIENT SERVICES | Facility: HOSPITAL | Age: 49
LOS: 1 days | End: 2024-05-08
Payer: COMMERCIAL

## 2024-05-08 ENCOUNTER — APPOINTMENT (OUTPATIENT)
Dept: INTERNAL MEDICINE | Facility: CLINIC | Age: 49
End: 2024-05-08
Payer: COMMERCIAL

## 2024-05-08 VITALS
WEIGHT: 144 LBS | DIASTOLIC BLOOD PRESSURE: 60 MMHG | HEART RATE: 86 BPM | OXYGEN SATURATION: 98 % | SYSTOLIC BLOOD PRESSURE: 120 MMHG | HEIGHT: 62 IN | BODY MASS INDEX: 26.5 KG/M2

## 2024-05-08 DIAGNOSIS — J06.9 ACUTE UPPER RESPIRATORY INFECTION, UNSPECIFIED: ICD-10-CM

## 2024-05-08 DIAGNOSIS — J45.30 MILD PERSISTENT ASTHMA, UNCOMPLICATED: ICD-10-CM

## 2024-05-08 DIAGNOSIS — R09.82 POSTNASAL DRIP: ICD-10-CM

## 2024-05-08 DIAGNOSIS — I10 ESSENTIAL (PRIMARY) HYPERTENSION: ICD-10-CM

## 2024-05-08 PROCEDURE — G0463: CPT

## 2024-05-08 PROCEDURE — 99214 OFFICE O/P EST MOD 30 MIN: CPT | Mod: GC

## 2024-05-08 NOTE — REVIEW OF SYSTEMS
[Nasal Discharge] : nasal discharge [Sore Throat] : sore throat [Postnasal Drip] : postnasal drip [Shortness Of Breath] : shortness of breath [Cough] : cough [Negative] : Heme/Lymph

## 2024-05-20 NOTE — HISTORY OF PRESENT ILLNESS
[FreeTextEntry8] : 47 y/o F w/ PMHx chronic sinusitis following w ENT, ovarian ca s/p CALVIN/BSO, presents for follow up of shortness of breath. Pt states she noticed last week she was having a choking sensation when trying to breathe, had previously had sinus infection and sore throat.  Seen April 30 in office, given albuterol MDI, and also for home nebulizer TID, and generic Symbicort. Using albuterol which she has been taking 2x/day but states "I still feel there is something stuck in my chest." Denies nighttime awakenings, +white mucus production without blood. Denies fatigue, weight changes, chest pain, n/v/d. +rhinorrhea and sneezing, with worsening of allergies.

## 2024-05-20 NOTE — END OF VISIT
[] : Resident [FreeTextEntry3] : Demonstrated correct way to use albuterol MDI and also how to use generic symbicort inhalr with ephasis on saline nasla spray for naslapssages  and hydration [Time Spent: ___ minutes] : I have spent [unfilled] minutes of time on the encounter.

## 2024-05-20 NOTE — PHYSICAL EXAM
[Normal] : affect was normal and insight and judgment were intact [de-identified] : +mild nasal erythema, no cobblestoning seen [de-identified] : +coarse breath sounds in b/l lower lobes

## 2024-05-20 NOTE — ASSESSMENT
[FreeTextEntry1] : 47 y/o F w/ hx of chronic sinusitis presents for follow up of cough and shortness of breath.   #Sinusitis - Advised pt to use ayr or ocean saline and to use every 2 hours while lying down - Advised to use allegra over zyrtec as it is less drying  - Azithromycin as Zpack for possible superinfection  #Cough  - Continue albuterol, 2 puffs TID PRN cough - Medrol pack taper for likely bronchitis, most likely viral  Follow up in 2 weeks if not improving, otherwise can return for physical in 2025 Toma Flowers, PGY-1  Case d/w Dr. Rdz

## 2024-05-24 ENCOUNTER — OUTPATIENT (OUTPATIENT)
Dept: OUTPATIENT SERVICES | Facility: HOSPITAL | Age: 49
LOS: 1 days | End: 2024-05-24
Payer: COMMERCIAL

## 2024-05-24 ENCOUNTER — APPOINTMENT (OUTPATIENT)
Dept: INTERNAL MEDICINE | Facility: CLINIC | Age: 49
End: 2024-05-24
Payer: COMMERCIAL

## 2024-05-24 ENCOUNTER — RX RENEWAL (OUTPATIENT)
Age: 49
End: 2024-05-24

## 2024-05-24 VITALS
WEIGHT: 144 LBS | HEART RATE: 85 BPM | BODY MASS INDEX: 26.5 KG/M2 | DIASTOLIC BLOOD PRESSURE: 72 MMHG | HEIGHT: 62 IN | SYSTOLIC BLOOD PRESSURE: 110 MMHG | OXYGEN SATURATION: 98 %

## 2024-05-24 DIAGNOSIS — R05.9 COUGH, UNSPECIFIED: ICD-10-CM

## 2024-05-24 DIAGNOSIS — I10 ESSENTIAL (PRIMARY) HYPERTENSION: ICD-10-CM

## 2024-05-24 PROCEDURE — 99214 OFFICE O/P EST MOD 30 MIN: CPT

## 2024-05-24 PROCEDURE — G0463: CPT

## 2024-05-24 RX ORDER — AZITHROMYCIN 250 MG/1
250 TABLET, FILM COATED ORAL
Qty: 1 | Refills: 0 | Status: DISCONTINUED | COMMUNITY
Start: 2024-05-08 | End: 2024-05-24

## 2024-05-24 RX ORDER — ALBUTEROL SULFATE 0.63 MG/3ML
0.63 SOLUTION RESPIRATORY (INHALATION) 3 TIMES DAILY
Qty: 1 | Refills: 0 | Status: DISCONTINUED | COMMUNITY
Start: 2019-03-29 | End: 2024-05-24

## 2024-05-24 RX ORDER — SERTRALINE 25 MG/1
25 TABLET, FILM COATED ORAL
Qty: 90 | Refills: 0 | Status: DISCONTINUED | COMMUNITY
Start: 2023-12-22 | End: 2024-05-24

## 2024-05-24 RX ORDER — AZELASTINE HYDROCHLORIDE 137 UG/1
0.1 SPRAY, METERED NASAL
Qty: 60 | Refills: 0 | Status: ACTIVE | COMMUNITY
Start: 2024-05-24 | End: 1900-01-01

## 2024-05-24 RX ORDER — AZELASTINE HYDROCHLORIDE 137 UG/1
137 SPRAY, METERED NASAL
Qty: 1 | Refills: 1 | Status: ACTIVE | COMMUNITY
Start: 2024-05-24 | End: 1900-01-01

## 2024-05-24 RX ORDER — SULFAMETHOXAZOLE AND TRIMETHOPRIM 800; 160 MG/1; MG/1
800-160 TABLET ORAL
Qty: 14 | Refills: 0 | Status: DISCONTINUED | COMMUNITY
Start: 2024-04-15 | End: 2024-05-24

## 2024-05-24 RX ORDER — METHYLPREDNISOLONE 4 MG/1
4 TABLET ORAL
Qty: 21 | Refills: 0 | Status: DISCONTINUED | COMMUNITY
Start: 2024-05-08 | End: 2024-05-24

## 2024-05-24 NOTE — PLAN
[FreeTextEntry1] : can try again robitussin/mucinex, flonase/nasal rinse/or nasal antihistamine cepacol spray throat, warm fluids slightly white TM b/l and maybe slight bulging right TM but nl left TM, clear otherwise tm, clear lungs

## 2024-05-25 NOTE — PHYSICAL EXAM
[No Acute Distress] : no acute distress [Well Nourished] : well nourished [Well Developed] : well developed [Well-Appearing] : well-appearing [No Lymphadenopathy] : no lymphadenopathy [Supple] : supple [No Respiratory Distress] : no respiratory distress  [No Accessory Muscle Use] : no accessory muscle use [Clear to Auscultation] : lungs were clear to auscultation bilaterally [Normal Supraclavicular Nodes] : no supraclavicular lymphadenopathy [Normal Posterior Cervical Nodes] : no posterior cervical lymphadenopathy [Normal Anterior Cervical Nodes] : no anterior cervical lymphadenopathy [No Joint Swelling] : no joint swelling [No Rash] : no rash [Normal Gait] : normal gait [Normal Affect] : the affect was normal [de-identified] : opaque soft white TMs b/l with mild bulging right TM but otherwise nl left TM, no erythema to TMs b/l, no sinus tenderness b/l, some cobblestoning posterior oropharynx without exudates

## 2024-05-25 NOTE — HISTORY OF PRESENT ILLNESS
[FreeTextEntry8] : 47 yo female with h/o as below here for f/up of cough and SOB.  Was never officially diagnosed with asthma but after had first covid shot had sudden SOB and went to ER and needed steroids/inhalers, then saw A/I afterwards prior to second vaccine and was given testing for possible asthma and given medication to pre-medicate and optimize ?asthma control prior to second covid vaccine and didn't have reaction to second covid vaccine.  Didn't need to continue asthma medicine after that, never saw pulmonologist. Recently had URI, saw Dr. Lewis several weeks ago and had some bronchospasm and given albuterol and symbicort at that time, got better but then a week later throat was very bad, gave her zpack and steroid pack.  When completed medications, felt throat and chest was still congested even though was slowly getting better.  Two days later, throat looked red and swollen and saw red dots around back of throat.  Then those symptoms resolved.  Phlegm is now clear.  Feels getting a little better, before had white mucus.  Still feels right side of throat and right ear is irritated.  Sometimes mucus feels in throat, trying to clear it from throat but then feels throat is swollen.  Still feels slight chest congestion and difficult to get it out.  Feels congestion still in chest.   Feels was wheezing on right side.  Much better with breathing, not nearly as SOB as was before. No fevers/chills.  Has been using nasal saline (affected throat worse) and then started sinus rinse and helped her more.  Nose is dry, not rhinorrhea.  Some sneezing yesterday.  Two days ago took benadryl, helped a little. No other active issues.

## 2024-05-25 NOTE — ASSESSMENT
[FreeTextEntry1] : 49 yo female with h/o as above including recent URI with bronchospasm requiring inhalers, steroids, antibiotics, here for f/up visit with residual cough, mucus, and throat discomfort.  Suspect resolving URI, would c/w supportive care, can try robitussin (although notes didn't work for her) for chest congestion, rx azelastine nasal spray given to try as nasal rinse and flonase hasn't worked as much for her, can try warm fluids and cepacol for throat discomfort.  RTO prn.

## 2024-06-04 DIAGNOSIS — R05.9 COUGH, UNSPECIFIED: ICD-10-CM

## 2024-06-24 ENCOUNTER — RX RENEWAL (OUTPATIENT)
Age: 49
End: 2024-06-24

## 2024-06-24 RX ORDER — BUDESONIDE AND FORMOTEROL FUMARATE DIHYDRATE 160; 4.5 UG/1; UG/1
160-4.5 AEROSOL RESPIRATORY (INHALATION)
Qty: 30.6 | Refills: 1 | Status: ACTIVE | COMMUNITY
Start: 2024-04-30 | End: 1900-01-01

## 2024-06-26 ENCOUNTER — NON-APPOINTMENT (OUTPATIENT)
Age: 49
End: 2024-06-26

## 2024-06-26 ENCOUNTER — APPOINTMENT (OUTPATIENT)
Dept: OPHTHALMOLOGY | Facility: CLINIC | Age: 49
End: 2024-06-26
Payer: COMMERCIAL

## 2024-06-26 PROCEDURE — 92012 INTRM OPH EXAM EST PATIENT: CPT

## 2024-06-26 PROCEDURE — 92083 EXTENDED VISUAL FIELD XM: CPT

## 2024-08-01 ENCOUNTER — APPOINTMENT (OUTPATIENT)
Dept: PEDIATRIC ALLERGY IMMUNOLOGY | Facility: CLINIC | Age: 49
End: 2024-08-01
Payer: COMMERCIAL

## 2024-08-01 VITALS
DIASTOLIC BLOOD PRESSURE: 68 MMHG | SYSTOLIC BLOOD PRESSURE: 106 MMHG | HEART RATE: 76 BPM | HEIGHT: 62 IN | BODY MASS INDEX: 27.99 KG/M2 | WEIGHT: 152.13 LBS | OXYGEN SATURATION: 96 %

## 2024-08-01 DIAGNOSIS — J30.1 ALLERGIC RHINITIS DUE TO POLLEN: ICD-10-CM

## 2024-08-01 DIAGNOSIS — H10.13 ACUTE ATOPIC CONJUNCTIVITIS, BILATERAL: ICD-10-CM

## 2024-08-01 PROCEDURE — 99214 OFFICE O/P EST MOD 30 MIN: CPT | Mod: 25,GC

## 2024-08-01 PROCEDURE — 95004 PERQ TESTS W/ALRGNC XTRCS: CPT

## 2024-08-01 NOTE — PHYSICAL EXAM
[Alert] : alert [Well Nourished] : well nourished [Healthy Appearance] : healthy appearance [No Acute Distress] : no acute distress [Well Developed] : well developed [Normal Voice/Communication] : normal voice communication [Normal Rate and Effort] : normal respiratory rhythm and effort [No Crackles] : no crackles [No Retractions] : no retractions [Normal Rate] : heart rate was normal  [Normal S1, S2] : normal S1 and S2 [No murmur] : no murmur [Regular Rhythm] : with a regular rhythm [No Rubs] : no pericardial rub [Normal Pupil & Iris Size/Symmetry] : normal pupil and iris size and symmetry [Sclera Not Icteric] : sclera not icteric [Conjunctival Erythema] : conjunctival erythema [Suborbital Bogginess] : suborbital bogginess (allergic shiners) [Normal TMs] : both tympanic membranes were normal [Normal Nasal Mucosa] : the nasal mucosa was normal [Normal Lips/Tongue] : the lips and tongue were normal [Normal Outer Ear/Nose] : the ears and nose were normal in appearance [No Nasal Discharge] : no nasal discharge [Normal Tonsils] : normal tonsils [Boggy Nasal Turbinates] : boggy and/or pale nasal turbinates [Pharyngeal erythema] : pharyngeal erythema [Posterior Pharyngeal Cobblestoning] : posterior pharyngeal cobblestoning [Clear Rhinorrhea] : clear rhinorrhea was seen [Soft] : abdomen soft [Not Tender] : non-tender [Not Distended] : not distended [Normal Mood] : mood was normal [Normal Affect] : affect was normal [Alert, Awake, Oriented as Age-Appropriate] : alert, awake, oriented as age appropriate [Exudate] : no exudate [Wheezing] : no wheezing was heard [Patches] : no patches [Urticaria] : no urticaria [Dermatographism] : no dermatographism [de-identified] : scleral injection

## 2024-08-01 NOTE — HISTORY OF PRESENT ILLNESS
[de-identified] : 8/1/24 Daniel is a 48 year old female with PMHx of Asthma who presents for evaluations. Last seen 2021. -this winter had what she thought was bacterial infection. Got abx. cough, asthma exacerbation. Cough lasted 1 month.   Asthma - Albuterol inhaler PRN. Last use in Winter. -does not take Flovent bid since 2021.  Allergy sx -eye redness dryness, pruritis, sneeze, rhinorrhea, congestion. Worse since winter. -CT Scan of head by ENT they said it was clear.  -Zyrtec, last use last week, PRN. Felt it made her feel dryer, even with saline use.  Possible food sensitivity - feels like shes choking on food intermittently. Worse with Kiwi, and Stevia?   11/2021 Pt has been taking flovent 2 puffs BID for the past week. Feels like her breathing is a little bit easier.   9/14/21: Pt presents today for spirometry. Gets winded when she goes up stairs - tires easily. Stops after 2-3 steps. No nocturnal cough. No daytime cough. No recent  wheezing. Allergies can cause wheezing.  No chest tightness.  9/1/2021: First dose in January. 25 minutes later she had some mucous/post-nasal drip - cleared with sips of water. Drove home and while she was driving she started to feel throat symptoms again. 1 hour later she had symptoms of "anaphylaxis." Felt throat closure sensation felt like she was suffocating.  Trouble breathing She was given an injection that gave her some relief but still felt like her throat was closing. Then was given Benadryl. Drove to the ED in [place].  Decadron, pepcid, benadryl. Suffocation sensation lasted 2 weeks. Took albuterol nebs which helped. Remote hx of reflux - currently well-controlled since she works nights. Took prednisone for a few days  Did you receive the Moderna or Pfizer vaccine?  Pfizer Have you ever had a?previous?allergic/anaphylactic reaction for which an EpiPen?(or other epinephrine autoinjector)?was required???  NO Do you have any history of allergies to medications/drugs??  NO Have you ever had a reaction to injectable steroids or any other injectable medication?  Never had Do you have any history of allergic reactions to vaccines???  Tetanus - swelling of her arm at the injection site Do you have any history of allergies to foods??? NO  Do you have any history of allergy to IV contrast???  NO Do you have any history of allergic reaction to bee sting?or other stinging insects??  NO Do you have any history of environmental allergies/hay fever???  yes - cat and grass Do you have any?history of asthma???  yes - worse in the winter Do you have any?history of?eczema or?atopic dermatitis???  YES Do you have any?history of contact dermatitis???  NO Do you have any?history of hives?(urticaria)?or angioedema??  NO Do you have any?history of a mast cell disorder???  NO Do you have any?history of any other allergic reaction of any kind???  NO Have you ever had a colonoscopy???When was your last colonoscopy?  YES -  Have you had a reaction to preparation for colonoscopy? When?  Have you ever used?Miralax?(polyethylene glycol) or another laxative?? When was your last use?  YES - last use was 3 months ago Do you have any cosmetic fillers? When was the last procedure?  NO Were you infected with?SARS-COV2?to your knowledge?(Have you ever had COVID-19)???  NO Have you received passive antibody therapy (monoclonal antibodies or convalescent serum) as treatment for COVID-19??  Do you take a beta-blocker???  NO Do you take an ACE inhibitor??  NO Do you use NSAIDs?? Did you take NSAIDs within 24 hours of your vaccine??  NO Do you have a history of vasovagal reactions (fainting after blood draw, shots or other situations)???  NO Do you have a history of anxiety or panic attacks???  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  What was the timing of your reaction? How soon did you feel any symptoms??? 25 minutes, postnasal drip. 1 hour later throat closure sensation. What were your symptoms?after receiving the COVID-19 vaccine?in chronological order???  Did you feel any:??  Itchiness? NO Rash/urticaria? NO  flushing NO Swelling? NO Difficulty swallowing/lump in throat?  no trouble swallowing, maybe lump in the throat sensation Shortness of breath/wheezing/cough?? yes - +trouble breathing, no cough, no wheeze Abdominal pain/cramping/vomiting/diarrhea? no Dizziness/Lightheadedness/Fainting/Loss of Consciousness?  Impending sense of doom? yes What medications were you treated with???  Benadryl (or H1 blocker); which one?? yes Pepcid (or another H2 blocker); which one?? yes Steroids (PO/IV/IM); which one?? yes - decadron Montelukast? no Epinephrine? no

## 2024-08-01 NOTE — PHYSICAL EXAM
[Alert] : alert [Well Nourished] : well nourished [Healthy Appearance] : healthy appearance [No Acute Distress] : no acute distress [Well Developed] : well developed [Normal Voice/Communication] : normal voice communication [Normal Rate and Effort] : normal respiratory rhythm and effort [No Crackles] : no crackles [No Retractions] : no retractions [Normal Rate] : heart rate was normal  [Normal S1, S2] : normal S1 and S2 [No murmur] : no murmur [Regular Rhythm] : with a regular rhythm [No Rubs] : no pericardial rub [Normal Pupil & Iris Size/Symmetry] : normal pupil and iris size and symmetry [Sclera Not Icteric] : sclera not icteric [Conjunctival Erythema] : conjunctival erythema [Suborbital Bogginess] : suborbital bogginess (allergic shiners) [Normal TMs] : both tympanic membranes were normal [Normal Nasal Mucosa] : the nasal mucosa was normal [Normal Lips/Tongue] : the lips and tongue were normal [Normal Outer Ear/Nose] : the ears and nose were normal in appearance [No Nasal Discharge] : no nasal discharge [Normal Tonsils] : normal tonsils [Boggy Nasal Turbinates] : boggy and/or pale nasal turbinates [Pharyngeal erythema] : pharyngeal erythema [Posterior Pharyngeal Cobblestoning] : posterior pharyngeal cobblestoning [Clear Rhinorrhea] : clear rhinorrhea was seen [Soft] : abdomen soft [Not Tender] : non-tender [Not Distended] : not distended [Normal Mood] : mood was normal [Normal Affect] : affect was normal [Alert, Awake, Oriented as Age-Appropriate] : alert, awake, oriented as age appropriate [Exudate] : no exudate [Wheezing] : no wheezing was heard [Patches] : no patches [Urticaria] : no urticaria [Dermatographism] : no dermatographism [de-identified] : scleral injection

## 2024-08-01 NOTE — HISTORY OF PRESENT ILLNESS
[de-identified] : 8/1/24 Daniel is a 48 year old female with PMHx of Asthma who presents for evaluations. Last seen 2021. -this winter had what she thought was bacterial infection. Got abx. cough, asthma exacerbation. Cough lasted 1 month.   Asthma - Albuterol inhaler PRN. Last use in Winter. -does not take Flovent bid since 2021.  Allergy sx -eye redness dryness, pruritis, sneeze, rhinorrhea, congestion. Worse since winter. -CT Scan of head by ENT they said it was clear.  -Zyrtec, last use last week, PRN. Felt it made her feel dryer, even with saline use.  Possible food sensitivity - feels like shes choking on food intermittently. Worse with Kiwi, and Stevia?   11/2021 Pt has been taking flovent 2 puffs BID for the past week. Feels like her breathing is a little bit easier.   9/14/21: Pt presents today for spirometry. Gets winded when she goes up stairs - tires easily. Stops after 2-3 steps. No nocturnal cough. No daytime cough. No recent  wheezing. Allergies can cause wheezing.  No chest tightness.  9/1/2021: First dose in January. 25 minutes later she had some mucous/post-nasal drip - cleared with sips of water. Drove home and while she was driving she started to feel throat symptoms again. 1 hour later she had symptoms of "anaphylaxis." Felt throat closure sensation felt like she was suffocating.  Trouble breathing She was given an injection that gave her some relief but still felt like her throat was closing. Then was given Benadryl. Drove to the ED in [place].  Decadron, pepcid, benadryl. Suffocation sensation lasted 2 weeks. Took albuterol nebs which helped. Remote hx of reflux - currently well-controlled since she works nights. Took prednisone for a few days  Did you receive the Moderna or Pfizer vaccine?  Pfizer Have you ever had a?previous?allergic/anaphylactic reaction for which an EpiPen?(or other epinephrine autoinjector)?was required???  NO Do you have any history of allergies to medications/drugs??  NO Have you ever had a reaction to injectable steroids or any other injectable medication?  Never had Do you have any history of allergic reactions to vaccines???  Tetanus - swelling of her arm at the injection site Do you have any history of allergies to foods??? NO  Do you have any history of allergy to IV contrast???  NO Do you have any history of allergic reaction to bee sting?or other stinging insects??  NO Do you have any history of environmental allergies/hay fever???  yes - cat and grass Do you have any?history of asthma???  yes - worse in the winter Do you have any?history of?eczema or?atopic dermatitis???  YES Do you have any?history of contact dermatitis???  NO Do you have any?history of hives?(urticaria)?or angioedema??  NO Do you have any?history of a mast cell disorder???  NO Do you have any?history of any other allergic reaction of any kind???  NO Have you ever had a colonoscopy???When was your last colonoscopy?  YES -  Have you had a reaction to preparation for colonoscopy? When?  Have you ever used?Miralax?(polyethylene glycol) or another laxative?? When was your last use?  YES - last use was 3 months ago Do you have any cosmetic fillers? When was the last procedure?  NO Were you infected with?SARS-COV2?to your knowledge?(Have you ever had COVID-19)???  NO Have you received passive antibody therapy (monoclonal antibodies or convalescent serum) as treatment for COVID-19??  Do you take a beta-blocker???  NO Do you take an ACE inhibitor??  NO Do you use NSAIDs?? Did you take NSAIDs within 24 hours of your vaccine??  NO Do you have a history of vasovagal reactions (fainting after blood draw, shots or other situations)???  NO Do you have a history of anxiety or panic attacks???  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  What was the timing of your reaction? How soon did you feel any symptoms??? 25 minutes, postnasal drip. 1 hour later throat closure sensation. What were your symptoms?after receiving the COVID-19 vaccine?in chronological order???  Did you feel any:??  Itchiness? NO Rash/urticaria? NO  flushing NO Swelling? NO Difficulty swallowing/lump in throat?  no trouble swallowing, maybe lump in the throat sensation Shortness of breath/wheezing/cough?? yes - +trouble breathing, no cough, no wheeze Abdominal pain/cramping/vomiting/diarrhea? no Dizziness/Lightheadedness/Fainting/Loss of Consciousness?  Impending sense of doom? yes What medications were you treated with???  Benadryl (or H1 blocker); which one?? yes Pepcid (or another H2 blocker); which one?? yes Steroids (PO/IV/IM); which one?? yes - decadron Montelukast? no Epinephrine? no

## 2024-08-01 NOTE — IMPRESSION
[_____] : cockroach ([unfilled]) [Allergy Testing Mixed Feathers] : feathers [Allergy Testing Dog] : dog [Allergy Testing Cat] : cat [] : molds [Allergy Testing Trees] : trees [Allergy Testing Weeds] : weeds [Allergy Testing Grasses] : grasses [________] : [unfilled]

## 2024-08-01 NOTE — CONSULT LETTER
[Dear  ___] : Dear  [unfilled], [Consult Letter:] : I had the pleasure of evaluating your patient, [unfilled]. [Please see my note below.] : Please see my note below. [Consult Closing:] : Thank you very much for allowing me to participate in the care of this patient.  If you have any questions, please do not hesitate to contact me. [Sincerely,] : Sincerely, [FreeTextEntry2] : Adilene Mei MD [FreeTextEntry3] : Kirti Phelps MD\par  Attending Physician \par  Division of Allergy/Immunology \par  [facility] \par  \par   of Medicine and Pediatrics\par  [facility] \par  \par  [address]\[place] [address]\par  Tel: [phone]\par  Fax: [phone]\par  Email: [email or web address]\par  \par  \par  \par

## 2024-08-01 NOTE — CONSULT LETTER
[Dear  ___] : Dear  [unfilled], [Consult Letter:] : I had the pleasure of evaluating your patient, [unfilled]. [Please see my note below.] : Please see my note below. [Consult Closing:] : Thank you very much for allowing me to participate in the care of this patient.  If you have any questions, please do not hesitate to contact me. [Sincerely,] : Sincerely, [FreeTextEntry2] : Adilene Mei MD [FreeTextEntry3] : Kirti Phelps MD\par  Attending Physician \par  Division of Allergy/Immunology \par  [facility] \par  \par   of Medicine and Pediatrics\par  [facility] \par  \par  [address]\[place] [address]\par  Tel: [phone]\par  Fax: [phone]\par  Email: [email or web address]\par  \par  \par  \par

## 2024-08-01 NOTE — REVIEW OF SYSTEMS
[Nl] : Integumentary [Eye Discharge] : eye discharge [Eye Redness] : redness [Eye Itching] : itchy eyes [Dry Eyes] : dryness ~T of the eyes [Puffy Eyelids] : puffy ~T eyelids [Bloodshot Eyes] : bloodshot ~T eyes [Redness Of Eyelid] : redness of ~T eyelid [Nosebleeds] : epistaxis [Rhinorrhea] : rhinorrhea [Nasal Dryness] : dryness of the nose [Nasal Congestion] : nasal congestion [Nasal Itching] : nasal itching [Sneezing] : sneezing [Post Nasal Drip] : no post nasal drip

## 2024-08-01 NOTE — REASON FOR VISIT
[Routine Follow-Up] : a routine follow-up visit for [Allergy Evaluation/ Skin Testing] : allergy evaluation and or skin testing [Congestion] : congestion [Runny Nose] : runny nose [Itchy Eyes] : itchy eyes [Red Eyes] : red eyes [Painful Eyes] : painful eyes [Discharge of Eyes] : discharge of eyes [Asthma] : asthma

## 2024-08-02 RX ORDER — KETOTIFEN FUMARATE 0.25 MG/ML
0.04 SOLUTION OPHTHALMIC
Qty: 2 | Refills: 5 | Status: ACTIVE | COMMUNITY
Start: 2024-08-02 | End: 1900-01-01

## 2024-08-02 RX ORDER — LORATADINE 10 MG/1
10 TABLET ORAL
Qty: 30 | Refills: 5 | Status: ACTIVE | COMMUNITY
Start: 2024-08-02 | End: 1900-01-01

## 2025-02-11 ENCOUNTER — NON-APPOINTMENT (OUTPATIENT)
Age: 50
End: 2025-02-11

## 2025-02-11 ENCOUNTER — APPOINTMENT (OUTPATIENT)
Dept: OPHTHALMOLOGY | Facility: CLINIC | Age: 50
End: 2025-02-11
Payer: COMMERCIAL

## 2025-02-11 PROCEDURE — 92014 COMPRE OPH EXAM EST PT 1/>: CPT

## 2025-02-11 PROCEDURE — 92134 CPTRZ OPH DX IMG PST SGM RTA: CPT

## 2025-03-20 ENCOUNTER — APPOINTMENT (OUTPATIENT)
Dept: INTERNAL MEDICINE | Facility: CLINIC | Age: 50
End: 2025-03-20
Payer: COMMERCIAL

## 2025-03-20 ENCOUNTER — OUTPATIENT (OUTPATIENT)
Dept: OUTPATIENT SERVICES | Facility: HOSPITAL | Age: 50
LOS: 1 days | End: 2025-03-20
Payer: COMMERCIAL

## 2025-03-20 VITALS
HEART RATE: 77 BPM | DIASTOLIC BLOOD PRESSURE: 68 MMHG | BODY MASS INDEX: 27.62 KG/M2 | WEIGHT: 151 LBS | OXYGEN SATURATION: 97 % | SYSTOLIC BLOOD PRESSURE: 106 MMHG

## 2025-03-20 DIAGNOSIS — I10 ESSENTIAL (PRIMARY) HYPERTENSION: ICD-10-CM

## 2025-03-20 DIAGNOSIS — J32.9 CHRONIC SINUSITIS, UNSPECIFIED: ICD-10-CM

## 2025-03-20 PROCEDURE — G0463: CPT

## 2025-03-20 PROCEDURE — 99214 OFFICE O/P EST MOD 30 MIN: CPT

## 2025-03-20 PROCEDURE — G2211 COMPLEX E/M VISIT ADD ON: CPT

## 2025-03-20 RX ORDER — MUPIROCIN 20 MG/G
2 OINTMENT TOPICAL 3 TIMES DAILY
Qty: 1 | Refills: 0 | Status: ACTIVE | COMMUNITY
Start: 2025-03-20 | End: 1900-01-01

## 2025-03-25 DIAGNOSIS — J32.9 CHRONIC SINUSITIS, UNSPECIFIED: ICD-10-CM

## 2025-05-21 ENCOUNTER — OUTPATIENT (OUTPATIENT)
Dept: OUTPATIENT SERVICES | Facility: HOSPITAL | Age: 50
LOS: 1 days | End: 2025-05-21
Payer: COMMERCIAL

## 2025-05-21 ENCOUNTER — APPOINTMENT (OUTPATIENT)
Dept: INTERNAL MEDICINE | Facility: CLINIC | Age: 50
End: 2025-05-21
Payer: COMMERCIAL

## 2025-05-21 VITALS
HEIGHT: 62 IN | BODY MASS INDEX: 27.79 KG/M2 | SYSTOLIC BLOOD PRESSURE: 116 MMHG | WEIGHT: 151 LBS | OXYGEN SATURATION: 97 % | DIASTOLIC BLOOD PRESSURE: 78 MMHG | HEART RATE: 86 BPM

## 2025-05-21 DIAGNOSIS — Z91.011 ALLERGY TO MILK PRODUCTS: ICD-10-CM

## 2025-05-21 DIAGNOSIS — I10 ESSENTIAL (PRIMARY) HYPERTENSION: ICD-10-CM

## 2025-05-21 DIAGNOSIS — C56.9 MALIGNANT NEOPLASM OF UNSPECIFIED OVARY: ICD-10-CM

## 2025-05-21 DIAGNOSIS — M85.80 OTHER SPECIFIED DISORDERS OF BONE DENSITY AND STRUCTURE, UNSPECIFIED SITE: ICD-10-CM

## 2025-05-21 DIAGNOSIS — Z00.00 ENCOUNTER FOR GENERAL ADULT MEDICAL EXAMINATION W/OUT ABNORMAL FINDINGS: ICD-10-CM

## 2025-05-21 DIAGNOSIS — F41.9 ANXIETY DISORDER, UNSPECIFIED: ICD-10-CM

## 2025-05-21 DIAGNOSIS — J32.9 CHRONIC SINUSITIS, UNSPECIFIED: ICD-10-CM

## 2025-05-21 PROCEDURE — 99396 PREV VISIT EST AGE 40-64: CPT

## 2025-05-22 PROCEDURE — 80053 COMPREHEN METABOLIC PANEL: CPT

## 2025-05-22 PROCEDURE — 82728 ASSAY OF FERRITIN: CPT

## 2025-05-22 PROCEDURE — 82306 VITAMIN D 25 HYDROXY: CPT

## 2025-05-22 PROCEDURE — 83540 ASSAY OF IRON: CPT

## 2025-05-22 PROCEDURE — 86304 IMMUNOASSAY TUMOR CA 125: CPT

## 2025-05-22 PROCEDURE — 83550 IRON BINDING TEST: CPT

## 2025-05-22 PROCEDURE — G0463: CPT

## 2025-05-22 PROCEDURE — 85027 COMPLETE CBC AUTOMATED: CPT

## 2025-05-22 PROCEDURE — 83036 HEMOGLOBIN GLYCOSYLATED A1C: CPT

## 2025-05-22 PROCEDURE — 84443 ASSAY THYROID STIM HORMONE: CPT

## 2025-05-22 PROCEDURE — 80061 LIPID PANEL: CPT

## 2025-05-23 DIAGNOSIS — E78.5 HYPERLIPIDEMIA, UNSPECIFIED: ICD-10-CM

## 2025-05-23 LAB
25(OH)D3 SERPL-MCNC: 28.3 NG/ML
ALBUMIN SERPL ELPH-MCNC: 4.4 G/DL
ALP BLD-CCNC: 134 U/L
ALT SERPL-CCNC: 26 U/L
ANION GAP SERPL CALC-SCNC: 17 MMOL/L
AST SERPL-CCNC: 21 U/L
BILIRUB SERPL-MCNC: 0.5 MG/DL
BUN SERPL-MCNC: 10 MG/DL
CALCIUM SERPL-MCNC: 9.8 MG/DL
CANCER AG125 SERPL-ACNC: 9 U/ML
CHLORIDE SERPL-SCNC: 102 MMOL/L
CHOLEST SERPL-MCNC: 321 MG/DL
CO2 SERPL-SCNC: 22 MMOL/L
CREAT SERPL-MCNC: 0.55 MG/DL
EGFRCR SERPLBLD CKD-EPI 2021: 111 ML/MIN/1.73M2
ESTIMATED AVERAGE GLUCOSE: 117 MG/DL
FERRITIN SERPL-MCNC: 76 NG/ML
GLUCOSE SERPL-MCNC: 93 MG/DL
HBA1C MFR BLD HPLC: 5.7 %
HCT VFR BLD CALC: 45.4 %
HDLC SERPL-MCNC: 45 MG/DL
HGB BLD-MCNC: 15 G/DL
IRON SATN MFR SERPL: 34 %
IRON SERPL-MCNC: 133 UG/DL
LDLC SERPL-MCNC: 241 MG/DL
MCHC RBC-ENTMCNC: 31.4 PG
MCHC RBC-ENTMCNC: 33 G/DL
MCV RBC AUTO: 95 FL
NONHDLC SERPL-MCNC: 276 MG/DL
PLATELET # BLD AUTO: 370 K/UL
POTASSIUM SERPL-SCNC: 4.4 MMOL/L
PROT SERPL-MCNC: 7.1 G/DL
RBC # BLD: 4.78 M/UL
RBC # FLD: 12.8 %
SODIUM SERPL-SCNC: 140 MMOL/L
TIBC SERPL-MCNC: 395 UG/DL
TRIGL SERPL-MCNC: 179 MG/DL
TSH SERPL-ACNC: 1.23 UIU/ML
UIBC SERPL-MCNC: 262 UG/DL
WBC # FLD AUTO: 6.3 K/UL

## 2025-05-28 DIAGNOSIS — C56.9 MALIGNANT NEOPLASM OF UNSPECIFIED OVARY: ICD-10-CM

## 2025-05-28 DIAGNOSIS — F41.9 ANXIETY DISORDER, UNSPECIFIED: ICD-10-CM

## 2025-05-28 DIAGNOSIS — Z91.011 ALLERGY TO MILK PRODUCTS: ICD-10-CM

## 2025-05-28 DIAGNOSIS — Z00.00 ENCOUNTER FOR GENERAL ADULT MEDICAL EXAMINATION WITHOUT ABNORMAL FINDINGS: ICD-10-CM

## 2025-05-28 DIAGNOSIS — M85.80 OTHER SPECIFIED DISORDERS OF BONE DENSITY AND STRUCTURE, UNSPECIFIED SITE: ICD-10-CM

## 2025-05-30 ENCOUNTER — APPOINTMENT (OUTPATIENT)
Dept: MAMMOGRAPHY | Facility: IMAGING CENTER | Age: 50
End: 2025-05-30
Payer: COMMERCIAL

## 2025-05-30 ENCOUNTER — OUTPATIENT (OUTPATIENT)
Dept: OUTPATIENT SERVICES | Facility: HOSPITAL | Age: 50
LOS: 1 days | End: 2025-05-30
Payer: COMMERCIAL

## 2025-05-30 ENCOUNTER — APPOINTMENT (OUTPATIENT)
Dept: ULTRASOUND IMAGING | Facility: IMAGING CENTER | Age: 50
End: 2025-05-30
Payer: COMMERCIAL

## 2025-05-30 ENCOUNTER — RESULT REVIEW (OUTPATIENT)
Age: 50
End: 2025-05-30

## 2025-05-30 DIAGNOSIS — Z00.00 ENCOUNTER FOR GENERAL ADULT MEDICAL EXAMINATION WITHOUT ABNORMAL FINDINGS: ICD-10-CM

## 2025-05-30 PROCEDURE — 77063 BREAST TOMOSYNTHESIS BI: CPT | Mod: 26

## 2025-05-30 PROCEDURE — 77067 SCR MAMMO BI INCL CAD: CPT | Mod: 26

## 2025-05-30 PROCEDURE — 77067 SCR MAMMO BI INCL CAD: CPT

## 2025-05-30 PROCEDURE — 76641 ULTRASOUND BREAST COMPLETE: CPT | Mod: 26,50

## 2025-05-30 PROCEDURE — 77063 BREAST TOMOSYNTHESIS BI: CPT

## 2025-05-30 PROCEDURE — 76641 ULTRASOUND BREAST COMPLETE: CPT

## 2025-06-09 ENCOUNTER — APPOINTMENT (OUTPATIENT)
Dept: RADIOLOGY | Facility: IMAGING CENTER | Age: 50
End: 2025-06-09
Payer: COMMERCIAL

## 2025-06-09 ENCOUNTER — OUTPATIENT (OUTPATIENT)
Dept: OUTPATIENT SERVICES | Facility: HOSPITAL | Age: 50
LOS: 1 days | End: 2025-06-09
Payer: COMMERCIAL

## 2025-06-09 DIAGNOSIS — M85.80 OTHER SPECIFIED DISORDERS OF BONE DENSITY AND STRUCTURE, UNSPECIFIED SITE: ICD-10-CM

## 2025-06-09 PROCEDURE — 77085 DXA BONE DENSITY AXL VRT FX: CPT

## 2025-06-09 PROCEDURE — 77085 DXA BONE DENSITY AXL VRT FX: CPT | Mod: 26

## 2025-06-11 ENCOUNTER — APPOINTMENT (OUTPATIENT)
Dept: SURGICAL ONCOLOGY | Facility: CLINIC | Age: 50
End: 2025-06-11

## 2025-06-11 VITALS
SYSTOLIC BLOOD PRESSURE: 99 MMHG | HEIGHT: 62 IN | WEIGHT: 145 LBS | HEART RATE: 66 BPM | OXYGEN SATURATION: 99 % | DIASTOLIC BLOOD PRESSURE: 63 MMHG | RESPIRATION RATE: 16 BRPM | BODY MASS INDEX: 26.68 KG/M2

## 2025-06-11 PROCEDURE — 99214 OFFICE O/P EST MOD 30 MIN: CPT

## 2025-07-16 ENCOUNTER — OUTPATIENT (OUTPATIENT)
Dept: OUTPATIENT SERVICES | Facility: HOSPITAL | Age: 50
LOS: 1 days | End: 2025-07-16
Payer: COMMERCIAL

## 2025-07-16 ENCOUNTER — APPOINTMENT (OUTPATIENT)
Dept: INTERNAL MEDICINE | Facility: CLINIC | Age: 50
End: 2025-07-16
Payer: COMMERCIAL

## 2025-07-16 VITALS
HEIGHT: 62 IN | SYSTOLIC BLOOD PRESSURE: 104 MMHG | BODY MASS INDEX: 27.23 KG/M2 | HEART RATE: 90 BPM | WEIGHT: 148 LBS | OXYGEN SATURATION: 98 % | DIASTOLIC BLOOD PRESSURE: 60 MMHG

## 2025-07-16 PROBLEM — R05.3 COUGH, PERSISTENT: Status: ACTIVE | Noted: 2019-03-29

## 2025-07-16 PROCEDURE — G2211 COMPLEX E/M VISIT ADD ON: CPT

## 2025-07-16 PROCEDURE — 99214 OFFICE O/P EST MOD 30 MIN: CPT

## 2025-07-16 PROCEDURE — G0463: CPT

## 2025-07-16 RX ORDER — BENZONATATE 100 MG/1
100 CAPSULE ORAL 3 TIMES DAILY
Qty: 21 | Refills: 1 | Status: ACTIVE | COMMUNITY
Start: 2025-07-16 | End: 1900-01-01

## 2025-07-16 RX ORDER — OLOPATADINE HYDROCHLORIDE 7 MG/ML
0.7 SOLUTION OPHTHALMIC DAILY
Qty: 1 | Refills: 5 | Status: ACTIVE | COMMUNITY
Start: 2025-07-16 | End: 1900-01-01

## 2025-07-16 RX ORDER — CALCIUM CARBONATE/VITAMIN D3 600 MG-10
600-10 TABLET ORAL DAILY
Qty: 1 | Refills: 0 | Status: ACTIVE | COMMUNITY
Start: 2025-07-16 | End: 1900-01-01

## 2025-07-16 RX ORDER — AZELASTINE HYDROCHLORIDE 137 UG/1
0.1 SPRAY, METERED NASAL
Qty: 1 | Refills: 3 | Status: ACTIVE | COMMUNITY
Start: 2025-07-16 | End: 1900-01-01

## 2025-07-17 DIAGNOSIS — I10 ESSENTIAL (PRIMARY) HYPERTENSION: ICD-10-CM

## 2025-07-21 DIAGNOSIS — R05.3 CHRONIC COUGH: ICD-10-CM

## 2025-07-21 DIAGNOSIS — E78.5 HYPERLIPIDEMIA, UNSPECIFIED: ICD-10-CM

## 2025-07-21 DIAGNOSIS — H10.13 ACUTE ATOPIC CONJUNCTIVITIS, BILATERAL: ICD-10-CM

## 2025-08-29 ENCOUNTER — APPOINTMENT (OUTPATIENT)
Dept: OPHTHALMOLOGY | Facility: CLINIC | Age: 50
End: 2025-08-29

## 2025-09-04 ENCOUNTER — APPOINTMENT (OUTPATIENT)
Dept: PEDIATRIC ALLERGY IMMUNOLOGY | Facility: CLINIC | Age: 50
End: 2025-09-04
Payer: COMMERCIAL

## 2025-09-04 VITALS
SYSTOLIC BLOOD PRESSURE: 104 MMHG | OXYGEN SATURATION: 95 % | HEIGHT: 62 IN | DIASTOLIC BLOOD PRESSURE: 68 MMHG | BODY MASS INDEX: 28.06 KG/M2 | WEIGHT: 152.5 LBS | HEART RATE: 84 BPM

## 2025-09-04 DIAGNOSIS — Z71.85 ENCOUNTER FOR IMMUNIZATION SAFETY COUNSELING: ICD-10-CM

## 2025-09-04 DIAGNOSIS — J45.30 MILD PERSISTENT ASTHMA, UNCOMPLICATED: ICD-10-CM

## 2025-09-04 DIAGNOSIS — Z23 ENCOUNTER FOR IMMUNIZATION: ICD-10-CM

## 2025-09-04 DIAGNOSIS — J30.1 ALLERGIC RHINITIS DUE TO POLLEN: ICD-10-CM

## 2025-09-04 DIAGNOSIS — H10.13 ACUTE ATOPIC CONJUNCTIVITIS, BILATERAL: ICD-10-CM

## 2025-09-04 PROCEDURE — 99213 OFFICE O/P EST LOW 20 MIN: CPT
